# Patient Record
Sex: MALE | Race: AMERICAN INDIAN OR ALASKA NATIVE | NOT HISPANIC OR LATINO | Employment: FULL TIME | URBAN - METROPOLITAN AREA
[De-identification: names, ages, dates, MRNs, and addresses within clinical notes are randomized per-mention and may not be internally consistent; named-entity substitution may affect disease eponyms.]

---

## 2024-06-06 ENCOUNTER — HOSPITAL ENCOUNTER (INPATIENT)
Facility: CLINIC | Age: 29
LOS: 5 days | Discharge: HOME | DRG: 885 | End: 2024-06-13
Attending: EMERGENCY MEDICINE | Admitting: PSYCHIATRY & NEUROLOGY

## 2024-06-06 ENCOUNTER — TELEPHONE (OUTPATIENT)
Dept: BEHAVIORAL HEALTH | Facility: CLINIC | Age: 29
End: 2024-06-06

## 2024-06-06 DIAGNOSIS — F33.2 MAJOR DEPRESSIVE DISORDER, RECURRENT SEVERE WITHOUT PSYCHOTIC FEATURES (H): Primary | ICD-10-CM

## 2024-06-06 DIAGNOSIS — F31.62 BIPOLAR DISORDER, CURRENT EPISODE MIXED, MODERATE (H): ICD-10-CM

## 2024-06-06 PROBLEM — F31.9 BIPOLAR DISORDER (H): Status: ACTIVE | Noted: 2024-06-06

## 2024-06-06 PROBLEM — F12.20 CANNABIS DEPENDENCE (H): Status: ACTIVE | Noted: 2024-06-06

## 2024-06-06 LAB
AMPHETAMINES UR QL SCN: ABNORMAL
BARBITURATES UR QL SCN: ABNORMAL
BENZODIAZ UR QL SCN: ABNORMAL
BZE UR QL SCN: ABNORMAL
CANNABINOIDS UR QL SCN: ABNORMAL
CREAT UR-MCNC: 194 MG/DL
FENTANYL UR QL: ABNORMAL
OPIATES UR QL SCN: ABNORMAL
PCP QUAL URINE (ROCHE): ABNORMAL

## 2024-06-06 PROCEDURE — 99285 EMERGENCY DEPT VISIT HI MDM: CPT | Performed by: EMERGENCY MEDICINE

## 2024-06-06 PROCEDURE — 99418 PROLNG IP/OBS E/M EA 15 MIN: CPT | Performed by: NURSE PRACTITIONER

## 2024-06-06 PROCEDURE — 80349 CANNABINOIDS NATURAL: CPT | Performed by: NURSE PRACTITIONER

## 2024-06-06 PROCEDURE — 80307 DRUG TEST PRSMV CHEM ANLYZR: CPT | Performed by: EMERGENCY MEDICINE

## 2024-06-06 PROCEDURE — 99255 IP/OBS CONSLTJ NEW/EST HI 80: CPT | Performed by: NURSE PRACTITIONER

## 2024-06-06 PROCEDURE — 250N000013 HC RX MED GY IP 250 OP 250 PS 637: Performed by: NURSE PRACTITIONER

## 2024-06-06 RX ORDER — QUETIAPINE FUMARATE 50 MG/1
100 TABLET, EXTENDED RELEASE ORAL AT BEDTIME
Status: DISCONTINUED | OUTPATIENT
Start: 2024-06-06 | End: 2024-06-13 | Stop reason: HOSPADM

## 2024-06-06 RX ADMIN — QUETIAPINE FUMARATE 100 MG: 50 TABLET, EXTENDED RELEASE ORAL at 20:47

## 2024-06-06 ASSESSMENT — ACTIVITIES OF DAILY LIVING (ADL)
ADLS_ACUITY_SCORE: 35

## 2024-06-06 ASSESSMENT — COLUMBIA-SUICIDE SEVERITY RATING SCALE - C-SSRS
5. HAVE YOU STARTED TO WORK OUT OR WORKED OUT THE DETAILS OF HOW TO KILL YOURSELF? DO YOU INTEND TO CARRY OUT THIS PLAN?: YES
6. HAVE YOU EVER DONE ANYTHING, STARTED TO DO ANYTHING, OR PREPARED TO DO ANYTHING TO END YOUR LIFE?: YES
4. HAVE YOU HAD THESE THOUGHTS AND HAD SOME INTENTION OF ACTING ON THEM?: YES
2. HAVE YOU ACTUALLY HAD ANY THOUGHTS OF KILLING YOURSELF IN THE PAST MONTH?: YES
3. HAVE YOU BEEN THINKING ABOUT HOW YOU MIGHT KILL YOURSELF?: YES
1. IN THE PAST MONTH, HAVE YOU WISHED YOU WERE DEAD OR WISHED YOU COULD GO TO SLEEP AND NOT WAKE UP?: YES
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
## 2024-06-06 NOTE — MEDICATION SCRIBE - ADMISSION MEDICATION HISTORY
Medication Scribe Admission Medication History    Admission medication history is complete. The information provided in this note is only as accurate as the sources available at the time of the update.    Information Source(s): Patient, Hospital records, and CareEverywhere/SureScripts via in-person    Pertinent Information: Patient has not taken Carafate in 9 years.     Changes made to PTA medication list:  Added: Sucralfate 1 gm/ 10 ml QID.  Deleted: None  Changed: None    Allergies reviewed with patient and updates made in EHR: yes    Medication History Completed By: Dedrick Bernal MD 6/6/2024 10:40 AM    PTA Med List   Medication Sig Last Dose    sucralfate (CARAFATE) 1 GM/10ML suspension Take 10 mLs (1 g) by mouth 4 times daily More than a month at unknown

## 2024-06-06 NOTE — PLAN OF CARE
Estevan Harris  June 6, 2024  Plan of Care Hand-off Note     Patient Care Path: inpatient mental health    Plan for Care:   Patient reports suicidal ideation with plan to overdose on Fentanyl.  Patient reports recent manic and dissociative episodes.  Patient has not been on medications for 3 years.  He has no outpatient supports.  Plan for inpatient admission.    Identified Goals and Safety Issues: Further evaluation and stabilization    Legal Status: Legal Status at Admission: Voluntary/Patient has signed consent for treatment    Psychiatry Consult: ordered       Updated Dr Shaffer regarding plan of care.           NBA HarrySW

## 2024-06-06 NOTE — ED NOTES
Report given to JEFFERY Acharya RN.    ED to Behavioral Floor Handoff    SITUATION  Estevan Harris is a 28 year old male who speaks English and lives in a home with others The patient arrived in the ED by private car from emergency room with a complaint of Psychiatric Evaluation and Suicidal (Thoughts only, no plan)  .The patient's current symptoms started/worsened 7 month(s) ago and during this time the symptoms have increased.   In the ED, pt was diagnosed with   Final diagnoses:   Bipolar disorder, current episode mixed, moderate (H)        Initial vitals were: BP: (!) 158/92  Pulse: 76  Temp: 98  F (36.7  C)  Resp: 16  Weight: 103.9 kg (229 lb)  SpO2: 98 %   --------  Is the patient diabetic? No   If yes, last blood glucose? --     If yes, was this treated in the ED? --  --------  Is the patient inebriated (ETOH) No or Impaired on other substances? No  MSSA done? N/A  Last MSSA score: --    Were withdrawal symptoms treated? N/A  Does the patient have a seizure history? No. If yes, date of most recent seizure--  --------  Is the patient patient experiencing suicidal ideation? has a history of suicidal attempts, most recent within 1 month     Homicidal ideation? denies current or recent homicidal ideation or behaviors.    Self-injurious behavior/urges? reports current or recent self injurious behavior or ideation including head-banging, ripping out piercings, punching walls, etc.  ------  Was pt aggressive in the ED No  Was a code called No  Is the pt now cooperative? Yes  -------  Meds given in ED: Medications - No data to display   Family present during ED course? No  Family currently present? No    BACKGROUND  Does the patient have a cognitive impairment or developmental disability? No  Allergies:   Allergies   Allergen Reactions    Amoxicillin     Asa [Aspirin]      Bleeding      Ibuprofen      bleeding   .   Social demographics are   Social History     Socioeconomic History    Marital status: Single      Spouse name: None    Number of children: None    Years of education: None    Highest education level: None   Tobacco Use    Smoking status: Every Day     Current packs/day: 0.50     Types: Cigarettes   Substance and Sexual Activity    Alcohol use: Yes     Comment: ocassional    Drug use: No    Sexual activity: Not Currently        ASSESSMENT  Labs results   Labs Ordered and Resulted from Time of ED Arrival to Time of ED Departure   URINE DRUG SCREEN PANEL - Abnormal       Result Value    Amphetamines Urine Screen Negative      Barbituates Urine Screen Negative      Benzodiazepine Urine Screen Negative      Cannabinoids Urine Screen Positive (*)     Cocaine Urine Screen Negative      Fentanyl Qual Urine Screen Negative      Opiates Urine Screen Negative      PCP Urine Screen Negative        Imaging Studies: No results found for this or any previous visit (from the past 24 hour(s)).   Most recent vital signs BP (!) 158/92   Pulse 76   Temp 98  F (36.7  C) (Oral)   Resp 16   Wt 103.9 kg (229 lb)   SpO2 98%   BMI 33.82 kg/m     Abnormal labs/tests/findings requiring intervention:---   Pain control: pt had none  Nausea control: pt had none    RECOMMENDATION  Are any infection precautions needed (MRSA, VRE, etc.)? No If yes, what infection? --  ---  Does the patient have mobility issues? independently. If yes, what device does the pt use? ---  ---  Is patient on 72 hour hold or commitment? No If on 72 hour hold, have hold and rights been given to patient? N/A  Are admitting orders written if after 10 p.m. ?N/A      Meryl Watts RN   [phone] [facility]
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
## 2024-06-06 NOTE — ED TRIAGE NOTES
Pt presents to the ED for concerns of suicidal thoughts/plan. Pt has attempted to end his life numerous times with fentanyl. Pt states last use of fentanyl was around 5-6 months ago. Pt has a hx of bipolar who has been off of medications for 5 years due to the price of them. Pt also believes he has dissociative identity disorder. Pt is looking for help.     Triage Assessment (Adult)       Row Name 06/06/24 0721          Triage Assessment    Airway WDL WDL        Respiratory WDL    Respiratory WDL WDL        Skin Circulation/Temperature WDL    Skin Circulation/Temperature WDL WDL        Cardiac WDL    Cardiac WDL WDL        Peripheral/Neurovascular WDL    Peripheral Neurovascular WDL WDL        Cognitive/Neuro/Behavioral WDL    Cognitive/Neuro/Behavioral WDL WDL

## 2024-06-06 NOTE — PROGRESS NOTES
Patient arrived at the [facility] unit at 1230. He was calm and cooperative upon arrival. Patient was admitted to the ER due to suicidal ideation. The plan I to overdose on fentanyl.  Patient stated he does not feel safe at home. He currently denied pain, SI/HI/SIB. Patient contracted for safety. Patient ate lunch and is resting comfortable in his chair. In  the milieu. No major concerns during the morning shift. Will continue to monitor for behaviors.
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
## 2024-06-06 NOTE — PROGRESS NOTES
Triage & Transition Services, Extended Care     Client Name: Estevan Harris    Date: June 6, 2024    Patient was seen    Mode of Assessment:      Service Type: attended group session  Session Start Time:  1609    Session End Time: 1639  Session Length: 30  Site Location: [facility] EMERGENCY DEPARTMENT                             [id number]  Total Number ofAttendees: 3  Topic:   balanced lifestyle, coping skills/lifestyle management   Response: able to recall/repeat info presented, cooperative with task, discussed personal experience with topic, listened actively, offered helpful suggestions to peers     Tereza Medrano [facility]ITA   Licensed Mental Health Professional (LMHP), Extended Care  [phone]

## 2024-06-06 NOTE — ED PROVIDER NOTES
"ED Provider Note  [facility]      History     Chief Complaint   Patient presents with    Psychiatric Evaluation    Suicidal     Thoughts only, no plan     HPI  Estevan Harris is a 28 year old male who presents with suicidal thoughts.  Patient reports that he has not been taking his medications for the last 3 years.  He reports he currently does not feel suicidal but notes \"some of my other personalities might\".  He does report he feels safe here in the emergency department.  Reports he last used cocaine possibly 2 weeks ago though \"possibly 1 personality used it recently\".  He reports that he has been self-medicating with drugs including cocaine, Adderall.  He reports he supposed to be on risperidone and Seroquel but stopped taking those about 3 years ago.  Reports that he wants to get his mental health in order.    Past Medical History  Past Medical History:   Diagnosis Date    Anxiety     Depressive disorder     NO ACTIVE PROBLEMS      Past Surgical History:   Procedure Laterality Date    none       sucralfate (CARAFATE) 1 GM/10ML suspension      Allergies   Allergen Reactions    Amoxicillin     Asa [Aspirin]      Bleeding      Ibuprofen      bleeding     Family History  History reviewed. No pertinent family history.  Social History   Social History     Tobacco Use    Smoking status: Every Day     Current packs/day: 0.50     Types: Cigarettes   Substance Use Topics    Alcohol use: Yes     Comment: ocassional    Drug use: No      A medically appropriate review of systems was performed with pertinent positives and negatives noted in the HPI, and all other systems negative.    Physical Exam   BP: (!) 158/92  Pulse: 76  Temp: 98  F (36.7  C)  Resp: 16  Weight: 103.9 kg (229 lb)  SpO2: 98 %  Physical Exam  General: awake, alert, NAD  Head: normal cephalic  HEENT: pupils equal, conjugate gaze intact  Neck: Supple  CV: regular rate   Lungs: Breathing comfortably on room air  EXT: No deformity " noted  Neuro: awake, answers questions appropriately. No focal deficits noted   Psych: calm, does not appear to be responding to internal stimuli    ED Course, Procedures, & Data      Procedures    Results for orders placed or performed during the hospital encounter of 06/06/24   Urine Drug Screen Panel     Status: Abnormal   Result Value Ref Range    Amphetamines Urine Screen Negative Screen Negative    Barbituates Urine Screen Negative Screen Negative    Benzodiazepine Urine Screen Negative Screen Negative    Cannabinoids Urine Screen Positive (A) Screen Negative    Cocaine Urine Screen Negative Screen Negative    Fentanyl Qual Urine Screen Negative Screen Negative    Opiates Urine Screen Negative Screen Negative    PCP Urine Screen Negative Screen Negative   Urine Drug Screen     Status: Abnormal    Narrative    The following orders were created for panel order Urine Drug Screen.  Procedure                               Abnormality         Status                     ---------                               -----------         ------                     Urine Drug Screen Panel[[id number]]      Abnormal            Final result                 Please view results for these tests on the individual orders.     Medications - No data to display  Labs Ordered and Resulted from Time of ED Arrival to Time of ED Departure   URINE DRUG SCREEN PANEL - Abnormal       Result Value    Amphetamines Urine Screen Negative      Barbituates Urine Screen Negative      Benzodiazepine Urine Screen Negative      Cannabinoids Urine Screen Positive (*)     Cocaine Urine Screen Negative      Fentanyl Qual Urine Screen Negative      Opiates Urine Screen Negative      PCP Urine Screen Negative       No orders to display          Critical care was not performed.     Medical Decision Making  The patient's presentation was of high complexity (a chronic illness severe exacerbation, progression, or side effect of treatment).    The patient's  evaluation involved:  review of external note(s) from 1 sources (see separate area of note for details)  ordering and/or review of 1 test(s) in this encounter (see separate area of note for details)  discussion of management or test interpretation with another health professional (Case was discussed with mental health .)    The patient's management necessitated high risk (a decision regarding hospitalization).    Assessment & Plan    .  Estevan is a 28-year-old male who presents with suicidal thoughts has not been taking mental health medications for a long time.  Patient has no new medical concerns will need mental health assessment.    Patient had a mental health assessment.  Please see their note for full details.  In short the mental health  notes that the patient endorses increased suicidal ideation for the last 3 months with plan to overdose on fentanyl.  Last attempt was several months ago, last hospitalization was 5 years ago.  Patient has not taken any of his medications and is interested in inpatient mental health and stabilization.  Per mental health assessment he has been struggling with a manic episode.     He did endorse to her that he was suicidal with plan to overdose on fentanyl so should he change his mind and want to leave he would likely need 72-hour hold or reassessment.    I have reviewed the nursing notes. I have reviewed the findings, diagnosis, plan and need for follow up with the patient.    New Prescriptions    No medications on file       Final diagnoses:   Bipolar disorder, current episode mixed, moderate (H)       Jesus Shaffer  [facility] EMERGENCY DEPARTMENT  6/6/2024     Jesus Shaffer MD  06/06/24 [id number]
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
## 2024-06-07 ENCOUNTER — TELEPHONE (OUTPATIENT)
Dept: BEHAVIORAL HEALTH | Facility: CLINIC | Age: 29
End: 2024-06-07

## 2024-06-07 PROCEDURE — 250N000013 HC RX MED GY IP 250 OP 250 PS 637: Performed by: NURSE PRACTITIONER

## 2024-06-07 PROCEDURE — 99245 OFF/OP CONSLTJ NEW/EST HI 55: CPT | Performed by: NURSE PRACTITIONER

## 2024-06-07 PROCEDURE — 99417 PROLNG OP E/M EACH 15 MIN: CPT | Performed by: NURSE PRACTITIONER

## 2024-06-07 PROCEDURE — 250N000013 HC RX MED GY IP 250 OP 250 PS 637: Performed by: PSYCHIATRY & NEUROLOGY

## 2024-06-07 RX ORDER — DIVALPROEX SODIUM 250 MG/1
250 TABLET, DELAYED RELEASE ORAL EVERY 12 HOURS SCHEDULED
Status: DISCONTINUED | OUTPATIENT
Start: 2024-06-07 | End: 2024-06-08

## 2024-06-07 RX ADMIN — DIVALPROEX SODIUM 250 MG: 250 TABLET, DELAYED RELEASE ORAL at 21:27

## 2024-06-07 RX ADMIN — QUETIAPINE FUMARATE 100 MG: 50 TABLET, EXTENDED RELEASE ORAL at 21:28

## 2024-06-07 ASSESSMENT — COLUMBIA-SUICIDE SEVERITY RATING SCALE - C-SSRS
TOTAL  NUMBER OF INTERRUPTED ATTEMPTS SINCE LAST CONTACT: NO
6. HAVE YOU EVER DONE ANYTHING, STARTED TO DO ANYTHING, OR PREPARED TO DO ANYTHING TO END YOUR LIFE?: NO
2. HAVE YOU ACTUALLY HAD ANY THOUGHTS OF KILLING YOURSELF?: NO
SUICIDE, SINCE LAST CONTACT: NO
1. SINCE LAST CONTACT, HAVE YOU WISHED YOU WERE DEAD OR WISHED YOU COULD GO TO SLEEP AND NOT WAKE UP?: NO
TOTAL  NUMBER OF ABORTED OR SELF INTERRUPTED ATTEMPTS SINCE LAST CONTACT: NO
ATTEMPT SINCE LAST CONTACT: NO

## 2024-06-07 NOTE — CONSULTS
"Adult Psychiatry Consultation     Estevan Harris MRN# [id number]   Age: 28 year old YOB: 1995   Date of Admission to ED: 6/6/2024    In person visit Details:     Patient was assessed and interviewed face-to-face in person with this writer   Assessment methods included conducting a formal interview with patient, review of medical records, collaboration with medical staff, and obtaining relevant collateral information from family and community providers when available.      DAVID Robb CNP            Contacts:   Attending Physician: Dean Nichols MD     Current Outpatient Psychiatrist: none   Primary Care Provider: No primary care provider on file.         Reason for Consult:       Pediatric Psychiatry IP Consult: Recommendations  Requesting Provider:  Other supervising provider, Mary Doty       Subjective:       14:05-14:15 Writer knocked and asked to meet with patient.  The patient was lying in a dark room with his back towards the door and he was facing the wall.  When writer entered he rolled over and sat up on the side of the bed.  He reported that he felt more clear minded today.  He was unable or unwilling to describe a clear minded  meant, saying \"clear\" is the best way to describe it.  He was unable to say what was different today compared to yesterday.  He denied suicidal ideation.  He rated depression at 5/10, anxiety 5/10, with 10 being the worst.  He did not give anger a number rating, but said it was off and on.  The trigger for his anger was not having his dog.  He denied HI/AH/VH.  He reported his sleep last night was the best he has had in a long time. He endorsed that the medication (Seroquel  mg) was helpful.  He reports his appetite is okay.  When writer inquired about his feeling safe to go home, he quickly became agitated and said he was not safe to go home.  He made some reference to having substance use issues and always need to take pills.  He declined " "to have a CD assessment completed and then repeated that he will always have CD issues because he will always have to take pills.  He reports he does not currently have the ability to take care of himself or his dog.  The patient continued to be agitated and asked for the conversation to be over.          Objective:       Patient was lying on the bed in a BEC room with his back to the door, facing the wall.  Writer knocked and requested to meet.  He rolled over and sat up on the side of the bed.  His responses to writer's questions were short but calm until writer asked if he thought he could be safe to discharge.  When discharge was brought up he quickly became agitated and oppositional and abruptly ended the conversation.     Review of staff notes:  Patient has been pleasant and calm in the milieu.  He reports SI with a plan to overdose on fentanyl during the day yesterday.  In the evening he denied SI/AH/VH.    Medications:     Current Facility-Administered Medications   Medication Dose Route Frequency Provider Last Rate Last Admin    QUEtiapine (SEROquel XR) 24 hr tablet 100 mg  100 mg Oral At Bedtime Zee Roque APRN CNP   100 mg at 06/06/24 2047     Current Outpatient Medications   Medication Sig Dispense Refill    sucralfate (CARAFATE) 1 GM/10ML suspension Take 10 mLs (1 g) by mouth 4 times daily 420 mL 1              Collateral information from chart review and phone calls:     Reviewed DEC assessment, Initial ED consult, and ED notes.    Per Ness Albright note on 6/7/2024:  \"Patient denies any current SIB, SI, or HI. Patient denies any symptoms of psychosis. Patient endorses using \"street drugs\", reports he does not want to answer any questions about this. Patient reports anger towards psychiatric nurse practitioner who recommended a chemical health assessment. Patient denies any concern for his substance use. Patient reports preference \"not to talk to that lady ever again\". Patient was informed " "that he does not meet criteria for inpatient admission at this time due to denying any acute risk to himself or others. Patient reports feeling \"misled\", and refuses to answer any further inquiries. Patient was offered observation stay in order to continue receiving his psychiatric medication prior to discharge. Patient is agreeable. Patient will be discharged with short term supply of medication after brief observation to ensure continued stability.\"    Collateral information from the famly/friend: none    Mental Status Exam:   Appearance:  dressed in hospital scrubs, unkempt, and disheveled   Attitude:  guarded and initially somewhat cooperative, after discharge was brought up irritable and dismissive.   Eye Contact:  poor   Mood:   anxiety improved.  Intermittent anger  Affect:  labile, irritable   Speech:  clear, coherent  Psychomotor Behavior:  no evidence of tardive dyskinesia, dystonia, or tics  Thought Process:  linear  Associations:  no loose associations  Thought Content:  no evidence of suicidal ideation or homicidal ideation and no evidence of psychotic thought, goal oriented to be admitted IP  Insight:  limited  Judgment:  limited  Oriented to:  time, person, and place  Attention Span and Concentration:  fair  Recent and Remote Memory:  fair  Fund of Knowledge: low-normal  Muscle Strength and Tone: normal  Gait and Station: Normal         Physical Exam:     /86   Pulse 82   Temp 97.2  F (36.2  C) (Oral)   Resp 16   Wt 103.9 kg (229 lb)   SpO2 96%   BMI 33.82 kg/m    Weight is 229 lbs 0 oz Data Unavailable Body mass index is 33.82 kg/m .    QTc: No recent EKG on file     Laboratory/Imaging:    Recent Results (from the past 168 hour(s))   Urine Drug Screen Panel    Collection Time: 06/06/24  7:54 AM   Result Value Ref Range    Amphetamines Urine Screen Negative Screen Negative    Barbituates Urine Screen Negative Screen Negative    Benzodiazepine Urine Screen Negative Screen Negative    " Cannabinoids Urine Screen Positive (A) Screen Negative    Cocaine Urine Screen Negative Screen Negative    Fentanyl Qual Urine Screen Negative Screen Negative    Opiates Urine Screen Negative Screen Negative    PCP Urine Screen Negative Screen Negative   Urine Creatinine for Drug Screen Panel    Collection Time: 06/06/24  7:54 AM   Result Value Ref Range    Creatinine Urine for Drug Screen 194 mg/dL       ROS: 10 point ROS neg other than the symptoms noted above in the subjective.     I have reviewed the physical done by ED provider on 6/6/2024. Notable changes include: none              Impression:     This patient is a 28 year old year old  male with a past psychiatric history of  PTSD, depression, and polysubstance use, who presented to the ED on 6/6/2024  for reports of memory loss before waking up in a pile of glass.  Prior to presenting to the ED, Estevan reports he learned his X is taking him to court for his emotional support dog and then triggered his PTSD from having his daughter taken away from him 8 years ago.  He reports a past dx of bipolar and he has not been taking his medication for 3 years. Estevan reports based on his friends reports of his different personalities and his research on the internet he thinks he has dissociative identity disorder and he is ready to get help and knows he needs to take medication to be okay.     Today, patient denied SI/AH/VH/HI when meeting with writer.  He became agitated when discharge was brought up and ended the conversation.     The patient meets criteria for Unspecified Trauma and Stressor related disorder, R/O cluster B personality disorder, polysubstance use, and R/O malingering  Patient has not been taking medication PTA.  Acute inpatient stabilization is not needed as indicated by is denying SI/HI/AH/VH.  His sleep has improved and he is eating and drinking without difficulty.  Patient became agitated when writer asked if he was safe to discharge.  He ended  the conversation.  He was seen by Ness Albright [facility] and he denied SI/HI/AH/VH to her also.  He declined CD assessment to both this writer and Ness Albright. Dr Marcell JACOBS saw patient and started him on Depakote  mg bid for mood stabilization.  He reports he slept very well after taking Seroquel  mg hs. Patient was changed to observation status due to not meeting criteria for IP admission.  He will be monitored while he starts Depakote and then be discharged from [facility].     Other recommendations are listed below.     Risk for harm is moderate.    Brief Therapeutic Intervention(s):   Provided rappport building, active listening, unconditional positive regard, and validation.    Legal Status: Voluntary           Diagnoses:     Principal Diagnosis: Unspecified Trauma and Stressor related disorder    Secondary psychiatric diagnoses of concern this admission:  Polysubstance Use Disorder  R/O cluster B personality disorder  R/O malingering for secondary gain - possibly involving court case over dog.     Current medical diagnosis being treated:   none         Recommendations:     Discussed the case and writer's recommendations with Dr Alexi Faith MD.     Recommend observation status for monitoring, safety, stabilization and further assessment and re-evaluation tomorrow for possible discharge.   2.   Continue:   Seroquel  mg hs for sleep and thought disorder  Depakote  mg bid for mood stabilization - started by Dr Jonathan JACOBS today.   3.   Recommend CD assessment - patient declined.   4.   Recommend quantitative THC urine level - pending  5.   Recommend obtaining collateral information from previous hospital admissions. Need ROIs.    6.   Continue to consult psychiatry as needed.     Attestation:  Patient time: 10 minutes  Reviewed labs, EKG, and relevant imagin minutes  Family/guardian/other time: 0 minutes  Team/BHP/ED/EC staff time: 20 minutes  Chart review: 20 minutes  Documentation: 30  minutes  Total time: 80 minutes spent on the date of the encounter.     I have provided critical care services at the bedside in the [facility] behavioral emergency [place], evaluating the patient, reviewing notes and laboratory values and directing care. I have discussed recommendation regarding whether or not hospitalization is needed and recommendations for medications and laboratory testing with the attending emergency department provider. Zee Segal, DNP, APRN, CNP June 7, 2024.    Disclaimer: This note consists of symbols derived from keyboarding, dictation, and/or voice recognition software. As a result, there may be errors in the script that have gone undetected.  Please consider this when interpreting information found in the chart.    Please call [facility]/DEC at [phone] if you have follow-up questions or wish to place another consult.

## 2024-06-07 NOTE — ED NOTES
Patient was calm and cooperative this morning. Patient denied pain, SI/HI/SIB. Patient contracted for safety. Patient was isolative and withdrawn this morning. He stated NP visited him this afternoon and the visit did not go well. Writer offered him a PRN but patient refused. Will continue to monitor for behaviors.

## 2024-06-07 NOTE — ED NOTES
This evening patient was medication compliant. He denied pain, SI/HI/SIB. Patient contracted for safety. Patient was engaged in the milieu with peers. No major concerns during the shift, Will continue to monitor for behaviors.

## 2024-06-07 NOTE — ED NOTES
No significant event at noc. shift. Patient resting comfortably with eyes closed, respirations unlabored on all safety checks. No indication to staff of being awake. No s/s of pain distress/discomfort. Staff to continue to assess/monitor.

## 2024-06-07 NOTE — ED PROVIDER NOTES
Patient who is in [facility], that was signed out to me by the night physician in the main emergency department at shift change.  The patient has been evaluated by mental health, completed a DEC assessment, and deemed in need of admission pending stabilization or other change in status.  Patient remains in the [facility] while awaiting inpatient bed placement.  The patient has been medically cleared, and home medications are being administered.  Any necessary psychiatric consultation has been ordered.  I will be available to manage this patient for any acute issues that should arise until the provider in the [facility] [place] arrives to assume care today, at which time they will assume care of the patient.  I will continue to be available and address any pending mental health recommendations or medical issues, that should arise either by nursing, the psychiatric consultant, or by the behavioral extended care team, until the time of that signout.       Dean Nichols MD  06/07/24 0754

## 2024-06-07 NOTE — ED PROVIDER NOTES
[facility] ED Mental Health Handoff Note:       Brief HPI:  This is a 28 year old male signed out to me.  See initial ED Provider note for full details of the presentation. Interval history is pertinent for ongoing ED boarding. No acute concerns today. Irritable. Feels Seroquel helps. Interested in restarting Depakote.    Home meds reviewed and ordered/administered: Yes    Medically stable for inpatient mental health admission: Yes.    Evaluated by mental health: Yes. The recommendation is for inpatient mental health treatment. Bed search in process    Safety concerns: At the time I received sign out, there were no safety concerns.    Hold Status:  Active Orders   N/A       Exam:   Patient Vitals for the past 24 hrs:   BP Temp Temp src Pulse Resp SpO2   06/07/24 1002 128/86 97.2  F (36.2  C) Oral 82 16 96 %       ED Course:    Medications   QUEtiapine (SEROquel XR) 24 hr tablet 100 mg ( Oral Canceled Entry 6/6/24 2200)            There were no significant events during my shift.      Impression:    ICD-10-CM    1. Bipolar disorder, current episode mixed, moderate (H)  F31.62           Plan:    Awaiting mental health evaluation/recommendations.  Will restart Depakote 250 mg BID.  Patient will be made ED OBS status.      RESULTS:   No results found for this visit on 06/06/24 (from the past 24 hour(s)).          MD Marcell Giordano Dung Hoang, MD  06/07/24 1330       Alexi Faith MD  06/07/24 1550

## 2024-06-07 NOTE — ED NOTES
Patient Education       Coronavirus Disease 2019 (COVID-19): Overview  Coronavirus disease 2019 (COVID-19) is an illness that infects the lungs. It's caused by a type of coronavirus called SARS-CoV-2. There are many types of coronaviruses. They are a very common cause of colds and bronchitis. They can cause a lung infection called pneumonia. Symptoms can range from mild to severe. Some people have no symptoms. These viruses are also found in some animals.   The virus that causes COVID-19 changes (mutates) all the time. This is what all viruses do. It leads to different versions of a virus. These are called variants. COVID-19 variants may spread more easily from person to person. They may cause milder symptoms. Or they may cause more severe symptoms.    The virus spreads and infects people easily. It can infect a person more easily if they are not immune to it. The virus most often spreads through droplets of fluid that a person coughs or sneezes into the air. It may be spread to you if you touch a surface with the virus on it and then touch your eyes, nose, or mouth.      To help prevent spreading the infection, wash your hands often, or use an alcohol-based hand .     For the latest information from the CDC:    · Go to the CDC website  · Call 148-JWV-TOMZ ([phone])    What are the symptoms of COVID-19?  Some people have no symptoms. Some have mild symptoms. And other people may have severe symptoms. Types of symptoms can vary from person to person. They may appear 2 to 14 days after contact with the virus. They can include:   · Fever  · Chills  · Coughing  · Trouble breathing or feeling short of breath  · Sore throat  · Stuffy or runny nose  · Headache  · Body aches  · Tiredness  · Nausea, vomiting, diarrhea, or abdominal pain  · New loss of sense of smell or taste  Check your symptoms with the CDC’s Coronavirus Self-.   What are possible complications of COVID-19?  The virus can cause an  Patient in room at start of shift resting.  Completed one to one assessment, and patient identifies SI with plan to right eye on Fentanyl.  Patient denies HI, AVH, and contracts for safety.   Patient reports he does not feel safe at home and is concerned about his dog.   Patient spoke with friend regarding pay check to be delivered to him.  Patient ate 90% of dinner.   Calm and cooperative.  Sleeping at 10 pm.    infection in the lungs. This is called pneumonia. In some cases, this can lead to death. Experts are still learning more about COVID-19 complications. Many other complications are possible. They include:   · Low blood pressure  · Kidney failure  · Inflammation of the brain or heart  · Rashes  Some people are at higher risk for complications. This includes:   · Older adults  · People with heart or lung disease  · People with diabetes or kidney disease  · People with health conditions that suppress the immune system  · People who take medicines that suppress the immune system  Rarely, a child may have a severe complication. This is called multisystem inflammatory syndrome in children (MIS-C). MIS-C seems to be like Kawasaki disease. This is a rare illness. It causes inflammation of blood vessels and body organs. MIS can also happen in adults. But this is less common.     How is COVID-19 diagnosed?  Your healthcare provider will ask:  · What symptoms you have  · Where you live  · If you’ve traveled recently  · If you’ve had contact with sick people  · If you are vaccinated against COVID-19  · If you have had COVID-19   You may have 1 of these tests for COVID-19:  · Viral (molecular) test. You may also hear this called a PCR or RT-PCR test. Viral tests are very accurate. A viral test looks for the genetic material (RNA) of the SARS-CoV-2 virus. There are a few ways to do this. A swab may be wiped inside your nose or throat. Or a long swab may be put into your nose down to the back of your throat. Or a sample of your saliva may be taken. Your test results may be back in 45 minutes to a few hours. This depends on the type of test. Some tests must be sent to a lab. These can take several days for the results. Test kits you can use at home are now available. Some of these need a prescription. If you use a home kit, follow the instructions in the kit closely. Some kits show results quickly at home. Others must be sent to a  lab for the results.  · Antigen test. This can find proteins from the SARS-CoV-2 virus. A swab may be wiped inside your nose or throat. Or a long swab may be put into your nose down to the back of your throat. Some results are back within 15 to 60 minutes. This depends on the type of test. Positive results are very accurate. But false positive results can happen. And the results can be negative even in people with COVID-19. This is more common in places where not many people have the virus. Antigen tests are more likely to miss a COVID-19 infection than a viral (molecular) test. You may need to have a viral test if your antigen test is negative but you have symptoms of COVID-19.  If your provider thinks or confirms that you have COVID-19, you may have other tests. These tests may include:   · Antibody blood test. This type of test can show if you had the virus in the past. It shows antibodies for the virus in the blood. The accuracy of these tests varies. And they are not available everywhere. An antibody test may not show if you have an infection right now. This is because it can take up to a few weeks for your body to make antibodies. None of the antibody tests can yet be used to tell if a person is immune to the virus.  · Sputum culture. If you have a wet cough, you may be asked to cough up a bit of mucus (sputum) from your lungs. This is tested for the virus. It may be tested for pneumonia.  · Imaging tests. You may have a chest X-ray or CT scan.  Can you get COVID-19 again?  Yes, you can get COVID-19 more than once. You may have not gotten immunity. You could have lost the immunity. Or you may get COVID-19 from a different strain (variant) of the virus that you are not immune to. But the COVID-19 vaccine helps people who had COVID-19 lower their risk of having the illness again.   Vaccines for COVID-19  The FDA and CDC advise vaccines to help prevent COVID-19. The vaccines can also make the illness less severe.  It can keep you from needing to go to the hospital.  And it can prevent the spread of the virus to other people. No vaccine is 100% effective at preventing an illness. But getting a vaccine is important. The Pfizer vaccine is available for people as young as age 5. Pregnant or breastfeeding people can have the vaccine. Ask your healthcare provider which vaccine may be best for you.   The vaccines are given as a shot (injection). This is most often done in a muscle in the upper arm. There is a 1-dose vaccine. This is from [employer]. There are 2-dose vaccines. These are from Pfizer and Moderna. For a 2-dose vaccine, the second dose is given several weeks after the first. Some people may need a third dose of Pfizer or Moderna.   Who needs a third dose of Pfizer or Moderna?  A third dose of the Pfizer or Moderna vaccine may be needed for some people. This is for people who have a very weak immune system. The third dose is part of their first (primary) series. It's not a booster. This can happen if you had a solid organ transplant. It can be caused by other conditions or treatments. This third dose is to help a person with a weak immune system build up better protection against the virus. It's given at least 28 days after the second dose. Talk with your healthcare provider about your health risks to see if you need a third dose as part of your primary series.   COVID-19 vaccine booster shots  Everyone age 18 or older can get a COVID-19 booster shot. Here are your options.   Pfizer or Moderna booster  A booster shot of the Pfizer or Moderna vaccines is advised for many people. The booster shot is to be given at least 6 months after your primary series. Talk with your healthcare provider about your situation and risk. The CDC says these people should get a Pfizer or Moderna booster shot:     · People age 50 or older  · People age 18 or older who live in long-term care facilities    The CDC states people 18 to 49  may choose to get the booster. This depends on their specific case and risk. This includes people with certain health conditions. It also includes people whose job or living setting puts them at higher risk for COVID-19.   Hernesto & Hernesto booster  A booster shot of the 1-dose Hernesto [employer] Hernesto vaccine is also available. It's advised for people ages 18 and older who got their first dose 2 or more months ago.   Mix and match  You may be able to choose which vaccine you get as a booster. This is called mix and match. Talk with your healthcare provider to learn more.   How is COVID-19 treated?  The most proven treatments right now are those to help your body while it fights the virus. This is known as supportive care. It includes:   · Getting rest.This helps your body fight the illness.  · Drinking fluids. Try to drink 6 to 8 glasses of fluids every day. Ask your provider which drinks are best for you. Don't have drinks with caffeine or alcohol.  · Taking over-the-counter (OTC) medicine.  These are used to help ease pain and reduce fever. Ask your provider which OTC medicine is safe for you to use.  You may need to stay in the hospital for severe illness. Your care may include:   · IV (intravenous) fluids. These are given through a vein. This helps to replace fluids in your body.  · Oxygen. You may be given supplemental oxygen. Or you may be put on a breathing machine (ventilator). This is done so you get enough oxygen in your body.  · Prone positioning. Your healthcare team may regularly turn you on your stomach. This is called prone positioning. It helps increase the amount of oxygen you get to your lungs. Follow their instructions on position changes while you're in the hospital. Also follow their advice on the best positions to help your breathing once you go home.  · Remdesivir. This is an antiviral medicine. The FDA has approved it for use on COVID-19. It works by stopping the spread of the SARS-CoV-2 virus in  the body. It's approved only for people who are in the hospital. It's for people 12 years and older who weigh at least 88 pounds (40 kgs). In some cases, it may also be used for people younger than 12 years or who weigh less than 88 pounds (40 kgs).  · Steroids or other anti-inflammatory medicines.  These are used to lessen the intense inflammation that some people with COVID-19 can have. The inflammation can lead to more trouble breathing. It can cause other complications or death.  · COVID-19 convalescent plasma. Plasma is the liquid part of blood. People who had COVID-19 may be asked to donate plasma. This is called COVID-19 convalescent plasma. The plasma may have antibodies. These can help fight COVID-19 in people who are very ill with it. The FDA has approved it for emergency use in some people with severe but early COVID-19. Ask your provider if you qualify to donate.  · Monoclonal antibody therapy. The FDA approved this for emergency use in some people. They must have a positive COVID-19 viral test. They must have mild to moderate symptoms. They can’t be in the hospital. It's approved for people 12 years and older. They must weigh at least 88 pounds (40 kgs). And they must be at high risk for severe COVID-19 and a hospital stay. This includes people who are 65 years and older. And it includes people with some chronic conditions. This therapy is not approved for people who are in the hospital with COVID-19 or who need oxygen. Your healthcare team will tell you if you qualify.  Are you at risk for COVID-19?  You are at risk for COVID-19 if any of these apply to you:  · You live in an area with cases of COVID-19  · You traveled to an area with cases of COVID-19  · You had close contact with someone who had COVID-19  Close contact means being within 6 feet.   Keep in mind that COVID-19 may be spread by people who do not show symptoms.   Last updated: 11/22/2021   Leroy last reviewed this educational content  on 9/1/2021    © 4406-7441 [employer]. All rights reserved. This information is not intended as a substitute for professional medical care. Always follow your healthcare professional's instructions.

## 2024-06-07 NOTE — TELEPHONE ENCOUNTER
[facility] ONLY:  R: [place] [facility] Access Inpatient Bed Call Log  6/6/24 @ 11:15 pm     [facility]: @ capacity for Adult MH beds.      Pt remains on waitlist pending appropriate placement availability

## 2024-06-07 NOTE — TELEPHONE ENCOUNTER
[facility] ONLY:  R: [place] [facility] Access Inpatient Bed Call Log  6/7/24 @ 1:00am    [facility]: @ capacity for Adult MH beds.     Pt remains on waitlist pending appropriate placement availability

## 2024-06-07 NOTE — TELEPHONE ENCOUNTER
R: Pt wants [facility] only.  Pt remains on the waitlist pending appropriate bed availlability.      10:44pm- Dr. Jensen accepts for 10/[place]/Marielle.     [address] staff notified as CRN is still taking report.     [facility] RN notified that he can call after midnight for report.      Indicia completed.

## 2024-06-07 NOTE — PROGRESS NOTES
"Triage & Transition Services, Extended Care     Therapy Progress Note    Patient: Estevan goes by \"Estevan,\" uses he/him pronouns  Date of Service: June 7, 2024  Site of Service: [facility] EMERGENCY DEPARTMENT                             [id number]  Patient was seen: yes  Mode of Assessment: In person    Presentation Summary: See below.    Therapeutic Intervention(s) Provided: Reviewed healthy living that supports positive mental health, including looking at sleep hygiene, regular movement, nutrition, and regular socialization., Engaged in activity scheduling and behavioral activation, looking at and reviewing the prior week's goals, problem solving any barriers and acknowledging successes, as well as setting new goals.    Current Symptoms:   irritable   agitation      Mental Status Exam   Affect:  (incongruent)  Appearance: Appropriate  Attention Span/Concentration: Attentive  Eye Contact: Variable    Fund of Knowledge: Appropriate   Language /Speech Content: Fluent  Language /Speech Volume: Normal  Language /Speech Rate/Productions: Normal  Recent Memory: Variable  Remote Memory: Variable  Mood: Other (please comment), Normal (incongruent)  Orientation to Person: Yes   Orientation to Place: Yes  Orientation to Time of Day: Yes  Orientation to Date: Yes     Situation (Do they understand why they are here?): Yes  Psychomotor Behavior: Normal  Thought Content: Suicidal, Hallucinations  Thought Form: Goal Directed    Treatment Objective(s) Addressed: identifying an appropriate aftercare plan, exploring obstacles to safety in the community    Patient Response to Interventions: verbalizes understanding    Progress Towards Goals: Patient Reports Symptoms Are: improving  Patient Progress Toward Goals: is making progress  Next Step to Work Toward Discharge: awaiting acceptance at community level of care  Awaiting Acceptance at Community Level of Care Comment: Patient reports he will have health insurance starting " "next month, and can follow up with outpatient care at that time.    Case Management:      Plan: observation  yes provider Dr. Alexi Faith    Clinical Substantiation: Patient denies any current SIB, SI, or HI. Patient denies any symptoms of psychosis. Patient endorses using \"street drugs\", reports he does not want to answer any questions about this. Patient reports anger towards psychiatric nurse practitioner who recommended a chemical health assessment. Patient denies any concern for his substance use. Patient reports preference \"not to talk to that lady ever again\". Patient was informed that he does not meet criteria for inpatient admission at this time due to denying any acute risk to himself or others. Patient reports feeling \"misled\", and refuses to answer any further inquiries. Patient was offered observation stay in order to continue receiving his psychiatric medication prior to discharge. Patient is agreeable. Patient will be discharged with short term supply of medication after brief observation to ensure continued stability.    Legal Status: Legal Status at Admission: Voluntary/Patient has signed consent for treatment    Session Status: Time session started: 1600  Time session ended: 1620  Session Duration (minutes): 20 minutes  Session Number: 1  Anticipated number of sessions or this episode of care: 3    Time Spent: 20 minutes    CPT Code: CPT Codes: 30329 - Psychotherapy (with patient) - 30 (16-37*) min    Diagnosis:   Patient Active Problem List   Diagnosis Code    Bipolar disorder (H) F31.9    Cannabis dependence (H) F12.20       Primary Problem This Admission: Active Hospital Problems    *Bipolar disorder (H)      Cannabis dependence (H)        CATARINO Garcia   Licensed Mental Health Professional (LMHP), [facility]  [phone]  "

## 2024-06-08 PROBLEM — F33.2 MAJOR DEPRESSIVE DISORDER, RECURRENT SEVERE WITHOUT PSYCHOTIC FEATURES (H): Status: ACTIVE | Noted: 2024-06-08

## 2024-06-08 LAB
ALBUMIN SERPL BCG-MCNC: 4.8 G/DL (ref 3.5–5.2)
ALP SERPL-CCNC: 95 U/L (ref 40–150)
ALT SERPL W P-5'-P-CCNC: 110 U/L (ref 0–70)
ANION GAP SERPL CALCULATED.3IONS-SCNC: 15 MMOL/L (ref 7–15)
AST SERPL W P-5'-P-CCNC: 48 U/L (ref 0–45)
BASOPHILS # BLD AUTO: 0.1 10E3/UL (ref 0–0.2)
BASOPHILS NFR BLD AUTO: 1 %
BILIRUB SERPL-MCNC: 0.2 MG/DL
BUN SERPL-MCNC: 12.4 MG/DL (ref 6–20)
CALCIUM SERPL-MCNC: 9.7 MG/DL (ref 8.6–10)
CHLORIDE SERPL-SCNC: 101 MMOL/L (ref 98–107)
CREAT SERPL-MCNC: 0.82 MG/DL (ref 0.67–1.17)
DEPRECATED HCO3 PLAS-SCNC: 24 MMOL/L (ref 22–29)
EGFRCR SERPLBLD CKD-EPI 2021: >90 ML/MIN/1.73M2
EOSINOPHIL # BLD AUTO: 0.3 10E3/UL (ref 0–0.7)
EOSINOPHIL NFR BLD AUTO: 3 %
ERYTHROCYTE [DISTWIDTH] IN BLOOD BY AUTOMATED COUNT: 12.8 % (ref 10–15)
FOLATE SERPL-MCNC: 9.4 NG/ML (ref 4.6–34.8)
GLUCOSE SERPL-MCNC: 87 MG/DL (ref 70–99)
HBA1C MFR BLD: 5.5 %
HCT VFR BLD AUTO: 49.3 % (ref 40–53)
HGB BLD-MCNC: 16.6 G/DL (ref 13.3–17.7)
IMM GRANULOCYTES # BLD: 0.1 10E3/UL
IMM GRANULOCYTES NFR BLD: 1 %
LYMPHOCYTES # BLD AUTO: 3.2 10E3/UL (ref 0.8–5.3)
LYMPHOCYTES NFR BLD AUTO: 25 %
MCH RBC QN AUTO: 30.9 PG (ref 26.5–33)
MCHC RBC AUTO-ENTMCNC: 33.7 G/DL (ref 31.5–36.5)
MCV RBC AUTO: 92 FL (ref 78–100)
MONOCYTES # BLD AUTO: 0.8 10E3/UL (ref 0–1.3)
MONOCYTES NFR BLD AUTO: 7 %
NEUTROPHILS # BLD AUTO: 8 10E3/UL (ref 1.6–8.3)
NEUTROPHILS NFR BLD AUTO: 63 %
NRBC # BLD AUTO: 0 10E3/UL
NRBC BLD AUTO-RTO: 0 /100
PLATELET # BLD AUTO: 349 10E3/UL (ref 150–450)
POTASSIUM SERPL-SCNC: 4.1 MMOL/L (ref 3.4–5.3)
PROT SERPL-MCNC: 7.3 G/DL (ref 6.4–8.3)
RBC # BLD AUTO: 5.37 10E6/UL (ref 4.4–5.9)
SODIUM SERPL-SCNC: 140 MMOL/L (ref 135–145)
TSH SERPL DL<=0.005 MIU/L-ACNC: 1.01 UIU/ML (ref 0.3–4.2)
VIT B12 SERPL-MCNC: 1103 PG/ML (ref 232–1245)
VIT D+METAB SERPL-MCNC: 31 NG/ML (ref 20–50)
WBC # BLD AUTO: 12.5 10E3/UL (ref 4–11)

## 2024-06-08 PROCEDURE — 82746 ASSAY OF FOLIC ACID SERUM: CPT | Performed by: REGISTERED NURSE

## 2024-06-08 PROCEDURE — 250N000013 HC RX MED GY IP 250 OP 250 PS 637: Performed by: REGISTERED NURSE

## 2024-06-08 PROCEDURE — 93005 ELECTROCARDIOGRAM TRACING: CPT

## 2024-06-08 PROCEDURE — 83036 HEMOGLOBIN GLYCOSYLATED A1C: CPT | Performed by: REGISTERED NURSE

## 2024-06-08 PROCEDURE — 82306 VITAMIN D 25 HYDROXY: CPT | Performed by: REGISTERED NURSE

## 2024-06-08 PROCEDURE — 93010 ELECTROCARDIOGRAM REPORT: CPT | Performed by: INTERNAL MEDICINE

## 2024-06-08 PROCEDURE — 250N000013 HC RX MED GY IP 250 OP 250 PS 637: Performed by: PSYCHIATRY & NEUROLOGY

## 2024-06-08 PROCEDURE — 85025 COMPLETE CBC W/AUTO DIFF WBC: CPT | Performed by: REGISTERED NURSE

## 2024-06-08 PROCEDURE — 99233 SBSQ HOSP IP/OBS HIGH 50: CPT | Performed by: REGISTERED NURSE

## 2024-06-08 PROCEDURE — 250N000013 HC RX MED GY IP 250 OP 250 PS 637: Performed by: NURSE PRACTITIONER

## 2024-06-08 PROCEDURE — 124N000002 HC R&B MH UMMC

## 2024-06-08 PROCEDURE — 82607 VITAMIN B-12: CPT | Performed by: REGISTERED NURSE

## 2024-06-08 PROCEDURE — 80053 COMPREHEN METABOLIC PANEL: CPT | Performed by: REGISTERED NURSE

## 2024-06-08 PROCEDURE — 84443 ASSAY THYROID STIM HORMONE: CPT | Performed by: REGISTERED NURSE

## 2024-06-08 PROCEDURE — 36415 COLL VENOUS BLD VENIPUNCTURE: CPT | Performed by: REGISTERED NURSE

## 2024-06-08 RX ORDER — DIVALPROEX SODIUM 250 MG/1
250 TABLET, DELAYED RELEASE ORAL DAILY
Status: DISCONTINUED | OUTPATIENT
Start: 2024-06-09 | End: 2024-06-08

## 2024-06-08 RX ORDER — TRAZODONE HYDROCHLORIDE 50 MG/1
50 TABLET, FILM COATED ORAL
Status: DISCONTINUED | OUTPATIENT
Start: 2024-06-08 | End: 2024-06-13 | Stop reason: HOSPADM

## 2024-06-08 RX ORDER — HYDROXYZINE HYDROCHLORIDE 25 MG/1
25 TABLET, FILM COATED ORAL EVERY 4 HOURS PRN
Status: DISCONTINUED | OUTPATIENT
Start: 2024-06-08 | End: 2024-06-13 | Stop reason: HOSPADM

## 2024-06-08 RX ORDER — OLANZAPINE 10 MG/2ML
10 INJECTION, POWDER, FOR SOLUTION INTRAMUSCULAR 3 TIMES DAILY PRN
Status: DISCONTINUED | OUTPATIENT
Start: 2024-06-08 | End: 2024-06-13 | Stop reason: HOSPADM

## 2024-06-08 RX ORDER — ACETAMINOPHEN 325 MG/1
650 TABLET ORAL EVERY 4 HOURS PRN
Status: DISCONTINUED | OUTPATIENT
Start: 2024-06-08 | End: 2024-06-13 | Stop reason: HOSPADM

## 2024-06-08 RX ORDER — DIVALPROEX SODIUM 500 MG/1
500 TABLET, EXTENDED RELEASE ORAL DAILY
Status: DISCONTINUED | OUTPATIENT
Start: 2024-06-09 | End: 2024-06-08

## 2024-06-08 RX ORDER — DIVALPROEX SODIUM 250 MG/1
250 TABLET, EXTENDED RELEASE ORAL DAILY
Status: DISCONTINUED | OUTPATIENT
Start: 2024-06-08 | End: 2024-06-08

## 2024-06-08 RX ORDER — LOPERAMIDE HCL 2 MG
2 CAPSULE ORAL 4 TIMES DAILY PRN
Status: DISCONTINUED | OUTPATIENT
Start: 2024-06-08 | End: 2024-06-13 | Stop reason: HOSPADM

## 2024-06-08 RX ORDER — AMOXICILLIN 250 MG
1 CAPSULE ORAL 2 TIMES DAILY PRN
Status: DISCONTINUED | OUTPATIENT
Start: 2024-06-08 | End: 2024-06-13 | Stop reason: HOSPADM

## 2024-06-08 RX ORDER — OLANZAPINE 10 MG/1
10 TABLET ORAL 3 TIMES DAILY PRN
Status: DISCONTINUED | OUTPATIENT
Start: 2024-06-08 | End: 2024-06-13 | Stop reason: HOSPADM

## 2024-06-08 RX ORDER — DIVALPROEX SODIUM 500 MG/1
500 TABLET, EXTENDED RELEASE ORAL AT BEDTIME
Qty: 1 TABLET | Refills: 0 | Status: COMPLETED | OUTPATIENT
Start: 2024-06-08 | End: 2024-06-08

## 2024-06-08 RX ORDER — MAGNESIUM HYDROXIDE/ALUMINUM HYDROXICE/SIMETHICONE 120; 1200; 1200 MG/30ML; MG/30ML; MG/30ML
30 SUSPENSION ORAL EVERY 4 HOURS PRN
Status: DISCONTINUED | OUTPATIENT
Start: 2024-06-08 | End: 2024-06-13 | Stop reason: HOSPADM

## 2024-06-08 RX ADMIN — DIVALPROEX SODIUM 500 MG: 500 TABLET, FILM COATED, EXTENDED RELEASE ORAL at 21:43

## 2024-06-08 RX ADMIN — QUETIAPINE FUMARATE 100 MG: 50 TABLET, EXTENDED RELEASE ORAL at 21:43

## 2024-06-08 RX ADMIN — DIVALPROEX SODIUM 250 MG: 250 TABLET, DELAYED RELEASE ORAL at 08:37

## 2024-06-08 ASSESSMENT — ACTIVITIES OF DAILY LIVING (ADL)
ADLS_ACUITY_SCORE: 28
CHANGE_IN_FUNCTIONAL_STATUS_SINCE_ONSET_OF_CURRENT_ILLNESS/INJURY: NO
ADLS_ACUITY_SCORE: 28
WALKING_OR_CLIMBING_STAIRS_DIFFICULTY: NO
DRESS: INDEPENDENT
WEAR_GLASSES_OR_BLIND: NO
ADLS_ACUITY_SCORE: 29
LAUNDRY: WITH SUPERVISION
HYGIENE/GROOMING: INDEPENDENT
ADLS_ACUITY_SCORE: 29
ADLS_ACUITY_SCORE: 28
DRESS: INDEPENDENT
ADLS_ACUITY_SCORE: 29
ADLS_ACUITY_SCORE: 28
ADLS_ACUITY_SCORE: 35
FALL_HISTORY_WITHIN_LAST_SIX_MONTHS: NO
ADLS_ACUITY_SCORE: 28
ADLS_ACUITY_SCORE: 29
HYGIENE/GROOMING: INDEPENDENT
ADLS_ACUITY_SCORE: 28
ADLS_ACUITY_SCORE: 28
ADLS_ACUITY_SCORE: 29
ADLS_ACUITY_SCORE: 28
DIFFICULTY_COMMUNICATING: NO
CONCENTRATING,_REMEMBERING_OR_MAKING_DECISIONS_DIFFICULTY: NO
ADLS_ACUITY_SCORE: 28
ADLS_ACUITY_SCORE: 45
ADLS_ACUITY_SCORE: 28
ADLS_ACUITY_SCORE: 28
DRESSING/BATHING_DIFFICULTY: NO
DIFFICULTY_EATING/SWALLOWING: NO
ADLS_ACUITY_SCORE: 35
DOING_ERRANDS_INDEPENDENTLY_DIFFICULTY: NO
ADLS_ACUITY_SCORE: 28
ORAL_HYGIENE: INDEPENDENT
TOILETING_ISSUES: NO
ADLS_ACUITY_SCORE: 28
ADLS_ACUITY_SCORE: 28
ORAL_HYGIENE: INDEPENDENT
ADLS_ACUITY_SCORE: 29
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
## 2024-06-08 NOTE — PLAN OF CARE
" INITIAL PSYCHOSOCIAL ASSESSMENT AND NOTE    Information for assessment was obtained from:       [x]Patient     []Parent     []Community provider    [x]Hospital records   []Other     []Guardian       Presenting Problem:  Patient is a 28 year old male who uses he/him. Patient was admitted to [facility] on 6/6/2024 [address] voluntarily.    Presenting issues and presentation for admit: Per admitting RN on 6/8/24:       Admission Note  Legal status: Pt is here Voluntary and Consent signed.     Presenting problem: Patient is a 28 years old male admitted from [facility], arrived to unit at 0210 accompanied by security via W/C. Patient is alert and oriented x4 and is able to verbalize needs.Patient presented to ED with family and friends with the c/o worsening psychosocial stress, Suicidal ideation, Depression and behavioral change which has increased for the last 3 months. Upon arrival to unit, pt appeared tense and asking why he was transferred to inpatient without being told and why the transfer is done in the middle of the night when he should be sleeping. Pt was reassured that he is here for treatment and his concerns will be addressed in the morning. Pt stated that he can not trust anyone at this point however pt was cooperative with the search at this time.     Assessment: Patient is alert and oriented x4 and is able to verbalize needs. Appears tense and guarded with assessment questions. States he is here to resume taking his mental health medications so he can get better. Pt refused any more assessment questions stating, \"I don't even know why I am being asked all these questions which I have already answered several times before, I want to go to sleep now\". Pt is dressed in hospital clothes. Hygiene is fare with unshaven everett. Gait is stable.     Living situation: Lives with his room mate     Hx of mental health & previous hospitalization: Patient states  he has been " "having mental health issues for many years and was taking medications for it but stopped for the last 3 years and now he wants to resume back taking his medications to get better.     Hx/current medical concern: Worsening psychosocial stress, SI and Depression,      Pain: Denies pain at this time.      Hx of chemical dependency and substance abuse: Patient states \" I use THC, Cocaine, Meth and everything but I stopped all that\".     Allergies: Amoxicillin, Asa (Aspirin), Ibuprofen.      UTox: Pt tested positive for Cannabinoids per chat review.     Recommendation: Monitor pt for SI, Depression and PTSD.     Orientation to unit: Oriented to unit and his room.            The following areas have been assessed:    History of Mental Health and Chemical Dependency:  Mental Health History:  Diagnosis per consult on 6/7/24: Principal Diagnosis: Unspecified Trauma and Stressor related disorder     Secondary psychiatric diagnoses of concern this admission:  Polysubstance Use Disorder  R/O cluster B personality disorder  R/O malingering for secondary gain - possibly involving court case over dog.   Past Psychiatric Diagnosis:   Per Patient:  MDD  Bipolar  PTSD  ADHD  Polysubstance use     Patient reports he thinks he has Dissociative Identity Disorder (DID) after reading about it on the internet.  The patient has a history of suicide attempts, been admitted in the past for attempts-mostly by fentanyl or drug induced.   Patient  has a history of engaged in non-suicidal self-injury did engage in cutting in jared [place] and does burn self at times due to drug use.     Previous psychiatric hospitalizations and treatments (including outpatient, residential, and inpatient care:  Hospitalized about 3-4 times.     Substance Use History  Pt reports recent cannabis, adderall (not prescribed) and cocaine. Long history of polysubstance use.     Patient's current relationship status is   single.   Patient reported having one child(miguel). "     Family Description (Constellation, significant information and events, Family Psychiatric History):  Does not talk to anyone in family except for his uncle.    Family psychiatric history is likely due to trauma history.     Significant Medical issues, Life events or Trauma history:   Trauma history in childhood, but does not remember.     Living Situation:  Patient's current living/housing situation is staying in own home/apartment. They live with roommate and dog and they report that housing is stable and they are able to return upon discharge.       Educational Background:    Patient's highest education level was high school graduate. Patient reports they are  able to understand written materials. Patient dyslexia so some assistance maybe needed.     Occupational and Financial Status:     Patient is currently employed full time and reports they are able to function appropriately at work..  Patient reports  income is obtained through employment.  Patient does identify finances as a current stressor.     Legal Concerns (current or past history):     Might be an issue with roommate and who owns their MARCIO dog     Legal Status:    Voluntary      Service History: None     Ethnic/Cultural/Spiritual considerations:   The patient describes their cultural background as White/, heterosexual, male.  Contextual influences on patient's health include severity of symptoms, lack of social support, and level of functioning.   Patient identified their preferred language to be English. Patient reported they do not need the assistance of an .  Spiritual considerations include: estrella/polytheistic religions     Social Functioning (organizations, interests, support system):   In their free time, patient reports they like to: anything outside-fishing, hunting, camping, etc. , cooking     Patient identified friends and uncle as part of their support system.  Patient identified the quality of these  relationships as fair.       Current Treatment Providers are:  No       GOALS FOR HOSPITALIZATION:  What do patient want to accomplish during this hospitalization to make things better for the patient.?   Patient priorities:  They identified stabilizing and getting to the proper amount of medications as a goal of this hospitalization.    Social Service Assessment/Plan:  Patient view:   Would like assistance with health insurance materials from job, it was suggested we consult with business office whenever possible. As of now, Pt is uninsured so has not had consistent mental health care.       Strengths and Assets:  The patient uses these coping skills to help with stress and hard times: see above.          Patient will have psychiatric assessment and medication management by the psychiatrist. Medications will be reviewed and adjusted per DO/MD/APRN CNP as indicated. The treatment team will continue to assess and stabilize the patient's mental health symptoms with the use of medications and therapeutic programming. Hospital staff will provide a safe environment and a therapeutic milieu. Staff will continue to assess patient as needed. Patient will participate in unit groups and activities. Patient will receive individual and group support on the unit.      CTC will do individual inpatient treatment planning and after care planning. CTC will discuss options for increasing community supports with the patient. CTC will coordinate with outpatient providers and will place referrals to ensure appropriate follow up care is in place.

## 2024-06-08 NOTE — PROGRESS NOTES
Pt slept for 3.25 hours. No c/o pain or discomfort. No PRNs given. Remains on every 15 minutes safety checks. Will continue to monitor.
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
## 2024-06-08 NOTE — ED NOTES
0200 - Report given to RN on [facility], ready to accept.    0207 - Patient left unit in good condition, escorted by [facility] PA and security.  No needs or concerns at this time.

## 2024-06-08 NOTE — PROGRESS NOTES
"Admission Note  Legal status: Pt is here Voluntary and Consent signed.    Presenting problem: Patient is a 28 years old male admitted from [facility], arrived to unit at 0210 accompanied by security via W/C. Patient is alert and oriented x4 and is able to verbalize needs.Patient presented to ED with family and friends with the c/o worsening psychosocial stress, Suicidal ideation, Depression and behavioral change which has increased for the last 3 months. Upon arrival to unit, pt appeared tense and asking why he was transferred to inpatient without being told and why the transfer is done in the middle of the night when he should be sleeping. Pt was reassured that he is here for treatment and his concerns will be addressed in the morning. Pt stated that he can not trust anyone at this point however pt was cooperative with the search at this time.     Assessment: Patient is alert and oriented x4 and is able to verbalize needs. Appears tense and guarded with assessment questions. States he is here to resume taking his mental health medications so he can get better. Pt refused any more assessment questions stating, \"I don't even know why I am being asked all these questions which I have already answered several times before, I want to go to sleep now\". Pt is dressed in hospital clothes. Hygiene is fare with unshaven everett. Gait is stable.    Living situation: Lives with his room mate    Hx of mental health & previous hospitalization: Patient states  he has been having mental health issues for many years and was taking medications for it but stopped for the last 3 years and now he wants to resume back taking his medications to get better.    Hx/current medical concern: Worsening psychosocial stress, SI and Depression,     Pain: Denies pain at this time.     Hx of chemical dependency and substance abuse: Patient states \" I use THC, Cocaine, Meth and everything but I stopped all that\".    Allergies: Amoxicillin, Asa (Aspirin), " Ibuprofen.     UTox: Pt tested positive for Cannabinoids per chat review.    Recommendation: Monitor pt for SI, Depression and PTSD.     Orientation to unit: Oriented to unit and his room.

## 2024-06-09 LAB
CHOLEST SERPL-MCNC: 173 MG/DL
HDLC SERPL-MCNC: 58 MG/DL
LDLC SERPL CALC-MCNC: 78 MG/DL
NONHDLC SERPL-MCNC: 115 MG/DL
TRIGL SERPL-MCNC: 187 MG/DL

## 2024-06-09 PROCEDURE — 90853 GROUP PSYCHOTHERAPY: CPT

## 2024-06-09 PROCEDURE — 124N000002 HC R&B MH UMMC

## 2024-06-09 PROCEDURE — 36415 COLL VENOUS BLD VENIPUNCTURE: CPT | Performed by: REGISTERED NURSE

## 2024-06-09 PROCEDURE — 80061 LIPID PANEL: CPT | Performed by: REGISTERED NURSE

## 2024-06-09 PROCEDURE — 250N000013 HC RX MED GY IP 250 OP 250 PS 637: Performed by: NURSE PRACTITIONER

## 2024-06-09 PROCEDURE — 250N000013 HC RX MED GY IP 250 OP 250 PS 637: Performed by: REGISTERED NURSE

## 2024-06-09 RX ADMIN — QUETIAPINE FUMARATE 100 MG: 50 TABLET, EXTENDED RELEASE ORAL at 21:45

## 2024-06-09 RX ADMIN — DIVALPROEX SODIUM 750 MG: 500 TABLET, FILM COATED, EXTENDED RELEASE ORAL at 21:45

## 2024-06-09 ASSESSMENT — ACTIVITIES OF DAILY LIVING (ADL)
ADLS_ACUITY_SCORE: 28
DRESS: INDEPENDENT
ADLS_ACUITY_SCORE: 28
HYGIENE/GROOMING: INDEPENDENT
ADLS_ACUITY_SCORE: 28
ORAL_HYGIENE: INDEPENDENT
ADLS_ACUITY_SCORE: 28
ADLS_ACUITY_SCORE: 28

## 2024-06-09 NOTE — PROGRESS NOTES
NOC Shift Report     Pt in bed at beginning of shift, breathing quiet and unlabored. Pt slept through the shift for 7 hours. No pt complaints or concerns at this time. No PRNs given. Remains on every 15 minutes safety checks. Will continue to monitor.

## 2024-06-09 NOTE — PLAN OF CARE
RN: Pt A/O x4, VSS, except /100 at the beginning of the shift, rechecked was 133/91, Pt remained asymptomatic, denied HA, dizziness throughout shift. Pt spent most of the evening shift in the lounge watching TV, social with peers. Pt presented with full range affect, mood was calm and pleasant. Pt was medication compliant, no reported or observed side effects. Pt is very happy about the medications that start working. Pt denied all MH symptoms and contracted for safety on the unit. Pt is eating and drink adequately. Pt denied pain or physical discomfort. Pt reported no issues with bowel and bladder. Continue with POC.      Problem: Suicide Risk  Goal: Absence of Self-Harm  Intervention: Promote Psychosocial Wellbeing  Recent Flowsheet Documentation  Taken 6/8/2024 1630 by Yazmin Mejía, RN  Supportive Measures: active listening utilized   Goal Outcome Evaluation:    Plan of Care Reviewed With: patient

## 2024-06-09 NOTE — PLAN OF CARE
"RN Assessment    SI: pt endorses chronic SI thoughts but states that with his medications on board, they have lessened to just \"a whisper\" versus how bold and loud the thoughts felt prior to admission. Pt contracts for safety  SIB: denies  HI: denies  AVH: denies  Thought process: WDL  Sleep: WDL  Affect & Mood: labile-- mood irritable/angry at start of shift but brightens as the day goes on  Behavior: pt is visible in the milieu most of the shift. Pt is selectively social with peers and staff. Pt more pleasant and cooperative with care later in the shift. Pt talked a lot about his therapy dog in training at home- Javier and how the dog is learning to help him cope with mood swings and remember to take his medications, exercise, etc. No medications scheduled or requested this shift. Pt looks forward to working with financial counselor tomorrow regarding picking out his insurance package to best support his mental health care.     Possible Medication SE: none reported or observed  Hygiene: adequate   VS: elevated BP noted at 147/103, standing 132/99 with tachy pulse 118-- pt denies headache, chest pain or blurred vision. Pt agreeable to tell RN if he experiences any of these symptoms  Pain: denies  Medical Concerns: none reported or observed.   Appetite & Fluid intake: adequate   Bowel & Bladder: no issues reported      "

## 2024-06-10 LAB
ATRIAL RATE - MUSE: 78 BPM
DIASTOLIC BLOOD PRESSURE - MUSE: NORMAL MMHG
INTERPRETATION ECG - MUSE: NORMAL
P AXIS - MUSE: 55 DEGREES
PR INTERVAL - MUSE: 142 MS
QRS DURATION - MUSE: 98 MS
QT - MUSE: 362 MS
QTC - MUSE: 412 MS
R AXIS - MUSE: 58 DEGREES
SYSTOLIC BLOOD PRESSURE - MUSE: NORMAL MMHG
T AXIS - MUSE: 35 DEGREES
VENTRICULAR RATE- MUSE: 78 BPM

## 2024-06-10 PROCEDURE — G0177 OPPS/PHP; TRAIN & EDUC SERV: HCPCS

## 2024-06-10 PROCEDURE — 250N000013 HC RX MED GY IP 250 OP 250 PS 637: Performed by: REGISTERED NURSE

## 2024-06-10 PROCEDURE — 250N000013 HC RX MED GY IP 250 OP 250 PS 637: Performed by: NURSE PRACTITIONER

## 2024-06-10 PROCEDURE — 124N000002 HC R&B MH UMMC

## 2024-06-10 PROCEDURE — H2032 ACTIVITY THERAPY, PER 15 MIN: HCPCS

## 2024-06-10 PROCEDURE — 99232 SBSQ HOSP IP/OBS MODERATE 35: CPT

## 2024-06-10 RX ADMIN — QUETIAPINE FUMARATE 100 MG: 50 TABLET, EXTENDED RELEASE ORAL at 21:59

## 2024-06-10 RX ADMIN — DIVALPROEX SODIUM 750 MG: 500 TABLET, FILM COATED, EXTENDED RELEASE ORAL at 21:59

## 2024-06-10 ASSESSMENT — ACTIVITIES OF DAILY LIVING (ADL)
ADLS_ACUITY_SCORE: 28
DRESS: INDEPENDENT
ADLS_ACUITY_SCORE: 28
ORAL_HYGIENE: INDEPENDENT
ADLS_ACUITY_SCORE: 28
HYGIENE/GROOMING: INDEPENDENT
ADLS_ACUITY_SCORE: 28

## 2024-06-10 NOTE — PLAN OF CARE
BEH IP Unit Acuity Rating Score (UARS)  Patient is given one point for every criteria they meet.    CRITERIA SCORING   On a 72 hour hold, court hold, committed, stay of commitment, or revocation. 0    Patient LOS on BEH unit exceeds 20 days. 0  LOS: 2   Patient under guardianship, 55+, otherwise medically complex, or under age 11. 0   Suicide ideation without relief of precipitating factors. 1   Current plan for suicide. 1   Current plan for homicide. 0   Imminent risk or actual attempt to seriously harm another without relief of factors precipitating the attempt. 0   Severe dysfunction in daily living (ex: complete neglect for self care, extreme disruption in vegetative function, extreme deterioration in social interactions). 1   Recent (last 7 days) or current physical aggression in the ED or on unit. 0   Restraints or seclusion episode in past 72 hours. 0   Recent (last 7 days) or current verbal aggression, agitation, yelling, etc., while in the ED or unit. 0   Active psychosis. 0   Need for constant or near constant redirection (from leaving, from others, etc).  0   Intrusive or disruptive behaviors. 0   Patient requires 3 or more hours of individualized nursing care per 8-hour shift (i.e. for ADLs, meds, therapeutic interventions). 0   TOTAL 3

## 2024-06-10 NOTE — PROGRESS NOTES
Rehab Group    Start time: 1315  End time: 1415  Patient time total: 60 minutes    attended full group    #6 attended   Group Type: occupational therapy   Group Topic Covered: cognitive activities, healthy leisure time, and problem solving     Group Session Detail:  Visual-spatial group game     Patient Response/Contribution:  cooperative with task, attentive, and actively engaged     Patient Detail:    Pt actively participated in a structured occupational therapy group with a focus on visuospatial problem solving and social engagement via a group game. Pt quickly demonstrated understanding of the novel 3-step task after an initial explanation. Pt remained focused and engaged throughout the full duration of group. Strategic in his task approach, and occasionally offered assistance to peers. Calm, pleasant, and engaged. Affect appeared to brighten in interactions.         Train & Education Service Per Session 45 + Minutes () OT Group code    Patient Active Problem List   Diagnosis    Bipolar disorder (H)    Cannabis dependence (H)    Major depressive disorder, recurrent severe without psychotic features (H)

## 2024-06-10 NOTE — PLAN OF CARE
RN: Pt A/O x4, VSS, except /96,  Pt remained asymptomatic ,denied HA, dizziness. Pt spent most of the evening shift in the lounge watching TV, social with peers. Pt presented with full range affect, mood was calm and pleasant. Pt was medication compliant, no reported or observed side effects.  Pt denied all MH symptoms, including SI/SIB/HI, hallucinations, anxiety and depression. Pt contracted for safety on the unit. Pt is eating and drink adequately. Pt denied pain or physical discomfort. Pt reported no issues with bowel and bladder. Continue with POC.   Pt's BP has been high, but pt was asymptomatic for the past few days. Sticky notes left for provider to F/U.      Goal Outcome Evaluation:     Problem: Suicidal Behavior  Goal: Suicidal Behavior is Absent or Managed  Outcome: Progressing

## 2024-06-10 NOTE — PROGRESS NOTES
"PSYCHIATRY  PROGRESS NOTE     DATE OF SERVICE   06/10/24          CHIEF COMPLAINT   \" Very stabilized.\"       SUBJECTIVE   Per nursing documentation:    RN: Pt A/O x4, VSS, except /96,  Pt remained asymptomatic ,denied HA, dizziness. Pt spent most of the evening shift in the lounge watching TV, social with peers. Pt presented with full range affect, mood was calm and pleasant. Pt was medication compliant, no reported or observed side effects.  Pt denied all MH symptoms, including SI/SIB/HI, hallucinations, anxiety and depression. Pt contracted for safety on the unit. Pt is eating and drink adequately. Pt denied pain or physical discomfort. Pt reported no issues with bowel and bladder. Continue with POC.   Pt's BP has been high, but pt was asymptomatic for the past few days. Sticky notes left for provider to F/U.         NOC Shift Report     Pt in bed at beginning of shift, breathing quiet and unlabored. Pt slept through the shift for 6.75 hours. No pt complaints or concerns at this time. No PRNs given. Remains on every 15 minutes safety checks. Will continue to monitor.           Per unit CTC documentation:    -Attending Provider: DAVID Cartagena CNP  -Voicemail Code: [id number]  -Team Note Due: Tuesday  -Next Steps:     1           Assessment/Intervention/Current Symtoms and Care Coordination:  Chart review and met with team, discussed pt progress, symptomology, and response to treatment.  Discussed the discharge plan and any potential impediments to discharge.        Discharge Plan or Goal:  Pending further stabilization, continued compliance with medications, continued activities of daily living, and development of a safe discharge plan.   Considerations:      Barriers to Discharge:  Impact of mental health symptoms on well being   Impact of mental health symptoms on activities of daily living  Need for medication monitoring and medication management     Referral Status:  TBD     Legal " "Status:  Voluntary   County:   File Number:   Start and expiration date of commitment:      Contacts:        Upcoming Meetings and Dates/Important Information:        -Still Needed on AVS:                OBJECTIVE   Met with patient in interview room of unit 10 [address]. Upon patient interview, patient reports their mood as, \" very stabilized.\"  Patient's mood is observed to be slightly elevated.  Patient's affect appears blunted.  Patient's thought process is tangential and circumstantial.  Patient reports leading up to presenting to the hospital he was not on medications for the past 3 years and that in the past he has benefited from mood stabilizer Depakote as well as quetiapine for further mood stabilization and sleep promotion.  Patient reports prior to hospitalization he was experiencing intense mood swings however could not afford psychiatric medication management due to needing health insurance.  Patient reports he now has employment and is in process of establishing health insurance coverage however will need some assistance with completing this paperwork during hospitalization.  Provider discussed that plan will be to follow up with unit CTC regarding this and patient in agreement.  Patient also expresses ongoing grief related to losing custody of his daughter and ongoing psychosocial stressor related to not being able to see his daughter.  Patient does not endorse anxiety or depression symptoms.. Denies any thoughts of SI, SIB, SA, intent or plan. Denies HI. Patient able to contract for safety. Patient denies any AH or VH.  Patient did report that in the past he has experienced visual hallucinations of seeing, \"shadow people.\"  Patient denies any paranoid thoughts or delusions. Patient endorses sleeping well overnight since initiation of medication quetiapine. Patient reports appetite is stable and they are eating well and as well as drinking fluids adequately.  Denies any issue with bowel or bladder.  Patient " vital signs reviewed and patient noted to be experiencing some ongoing hypertension and elevated pulse.  Plan will be to continue to monitor this for now and consider initiation of PRN hydralazine to treat if blood pressure continues to be elevated.  Plan will also be to consult with internal medicine team if hypertension persists.  Patient does report medical concern of a persistent wart on his left thumb which has not responded to OTC treatments.  Provider discussed option of consulting with internal medicine provider regarding if this can be treated and patient in agreement with this.  Patient denies any medical concerns today. Patient has no other questions or concerns. Today, plan will be to continue current medication regimen with no changes.  Provider discussed the plan will be to recheck VPA level, CMP, and CBC on the morning of 6/13/2024.  Patient in agreement with this.       MEDICATIONS   Medications:  Scheduled Meds:  Current Facility-Administered Medications   Medication Dose Route Frequency Provider Last Rate Last Admin    divalproex sodium extended-release (DEPAKOTE ER) 24 hr tablet 750 mg  750 mg Oral At Bedtime Nafisa Akins APRN CNP   750 mg at 06/09/24 2145    QUEtiapine (SEROquel XR) 24 hr tablet 100 mg  100 mg Oral At Bedtime Zee Roque APRN CNP   100 mg at 06/09/24 2145     Continuous Infusions:  Current Facility-Administered Medications   Medication Dose Route Frequency Provider Last Rate Last Admin     PRN Meds:.  Current Facility-Administered Medications   Medication Dose Route Frequency Provider Last Rate Last Admin    acetaminophen (TYLENOL) tablet 650 mg  650 mg Oral Q4H PRN Mouna Jensenoa, DO        alum & mag hydroxide-simethicone (MAALOX) suspension 30 mL  30 mL Oral Q4H PRN Gianni Jensen, DO        hydrOXYzine HCl (ATARAX) tablet 25 mg  25 mg Oral Q4H PRN Camila, Gianni, DO        loperamide (IMODIUM) capsule 2 mg  2 mg Oral 4x Daily PRN Camila, Gianni, DO        melatonin  "tablet 5 mg  5 mg Oral At Bedtime PRN Gianni Jensen, DO        OLANZapine (zyPREXA) tablet 10 mg  10 mg Oral TID PRN Gianni Jensen,         Or    OLANZapine (zyPREXA) injection 10 mg  10 mg Intramuscular TID PRN Gianni Jensen,         senna-docusate (SENOKOT-S/PERICOLACE) 8.6-50 MG per tablet 1 tablet  1 tablet Oral BID PRN Gianni Jensen,         traZODone (DESYREL) tablet 50 mg  50 mg Oral At Bedtime PRN Gianni Jensen, DO           Medication adherence issues: MS Med Adherence Y/N: No  Medication side effects: MEDICATION SIDE EFFECTS: no side effects reported  Benefit: Yes / No: Yes       ROS   Pertinent items are noted in HPI.       MENTAL STATUS EXAM   Vitals: /82 (BP Location: Left arm, Patient Position: Sitting, Cuff Size: Adult Regular)   Pulse (!) 130   Temp 97.6  F (36.4  C) (Oral)   Resp 16   Wt 103.5 kg (228 lb 1.6 oz)   SpO2 97%   BMI 33.68 kg/m      Appearance:  Casually groomed  Mood: Reports, \"very stabilized.\"  Patient's mood is observed to be slightly elevated.  Affect: blunted  was congruent to speech  Suicidal Ideation: PRESENT / ABSENT: absent   Homicidal Ideation: PRESENT / ABSENT: absent   Thought process: circumstantial and tangential   Thought content: denies suicidal and violent ideation.   Fund of Knowledge: Average  Attention/Concentration: Easily distracted  Language ability:  Intact  Memory: Appears intact, not formally assessed  Insight: Limited  Judgement: Limited  Orientation: Yes, x4  Psychomotor Behavior: normal or unremarkable    Muscle Strength and Tone: MuscleStrength: Normal  Gait and Station: Normal       LABS   personally reviewed.      Latest Reference Range & Units 06/06/24 07:54 06/08/24 12:40 06/08/24 13:29 06/09/24 08:27   Sodium 135 - 145 mmol/L   140    Potassium 3.4 - 5.3 mmol/L   4.1    Chloride 98 - 107 mmol/L   101    Carbon Dioxide (CO2) 22 - 29 mmol/L   24    Urea Nitrogen 6.0 - 20.0 mg/dL   12.4    Creatinine 0.67 - 1.17 mg/dL   0.82    GFR Estimate " >60 mL/min/1.73m2   >90    Calcium 8.6 - 10.0 mg/dL   9.7    Anion Gap 7 - 15 mmol/L   15    Albumin 3.5 - 5.2 g/dL   4.8    Protein Total 6.4 - 8.3 g/dL   7.3    Alkaline Phosphatase 40 - 150 U/L   95    ALT 0 - 70 U/L   110 (H)    AST 0 - 45 U/L   48 (H)    Bilirubin Total <=1.2 mg/dL   0.2    Cholesterol <200 mg/dL    173   Creatinine Urine mg/dL 194      Folate 4.6 - 34.8 ng/mL   9.4    Glucose 70 - 99 mg/dL   87    HDL Cholesterol >=40 mg/dL    58   Hemoglobin A1C <5.7 %   5.5    LDL Cholesterol Calculated <=100 mg/dL    78   Non HDL Cholesterol <130 mg/dL    115   Triglycerides <150 mg/dL    187 (H)   TSH 0.30 - 4.20 uIU/mL   1.01    Vitamin B12 232 - 1,245 pg/mL   1,103    Vitamin D, Total (25-Hydroxy) 20 - 50 ng/mL   31    WBC 4.0 - 11.0 10e3/uL   12.5 (H)    Hemoglobin 13.3 - 17.7 g/dL   16.6    Hematocrit 40.0 - 53.0 %   49.3    Platelet Count 150 - 450 10e3/uL   349    RBC Count 4.40 - 5.90 10e6/uL   5.37    MCV 78 - 100 fL   92    MCH 26.5 - 33.0 pg   30.9    MCHC 31.5 - 36.5 g/dL   33.7    RDW 10.0 - 15.0 %   12.8    % Neutrophils %   63    % Lymphocytes %   25    % Monocytes %   7    % Eosinophils %   3    % Basophils %   1    Absolute Basophils 0.0 - 0.2 10e3/uL   0.1    Absolute Eosinophils 0.0 - 0.7 10e3/uL   0.3    Absolute Immature Granulocytes <=0.4 10e3/uL   0.1    Absolute Lymphocytes 0.8 - 5.3 10e3/uL   3.2    Absolute Monocytes 0.0 - 1.3 10e3/uL   0.8    % Immature Granulocytes %   1    Absolute Neutrophils 1.6 - 8.3 10e3/uL   8.0    Absolute NRBCs 10e3/uL   0.0    NRBCs per 100 WBC <1 /100   0    Amphetamine Qual Urine Screen Negative  Screen Negative      Fentanyl Qual Urine Screen Negative  Screen Negative      Cocaine Urine Screen Negative  Screen Negative      Benzodiazepine Urine Screen Negative  Screen Negative      Opiates Qualitative Urine Screen Negative  Screen Negative      PCP Urine Screen Negative  Screen Negative      Cannabinoids Qual Urine Screen Negative  Screen Positive !    "   Barbiturates Qual Urine Screen Negative  Screen Negative      EKG 12-LEAD, TRACING ONLY   Rpt (C)     (H): Data is abnormally high  !: Data is abnormal  (C): Corrected  Rpt: View report in Results Review for more information    No results found for: \"PHENYTOIN\", \"PHENOBARB\", \"VALPROATE\", \"CBMZ\"       DIAGNOSIS   Principal Problem:    Unspecified Bipolar and Related Disorder - r/o Substance-Induced Bipolar and Related Disorder vs Bipolar Disorder vs Personality Disorder  Secondary Diagnoses:   Polysubstance use disorder (cannabis, opiates)  Rule out cluster B traits    Clinically Significant Risk Factors                                       # Financial/Environmental Concerns: unable to afford medication(s)                Active Problem List:  Patient Active Problem List   Diagnosis    Bipolar disorder (H)    Cannabis dependence (H)    Major depressive disorder, recurrent severe without psychotic features (H)          HOSPITAL COURSE AND ASSESSMENT   Estevan Harris is a 28 year old male with history of  bipolar disorder, major depressive disorder, anxiety disorder, PTSD, ADHD, and polysubstance use. Patient was brought in by his roommate to [facility] ED on 6/6/2024 for reports of manic behaviors and memory loss.  On Monday, patient reportedly woke up in a pile of glass.  He does not remember the events that preceded this incident.  Patient states he \"blacks out\" when he has a manic episode.  He reports recent stressors including termination of a relationship, in which ex-partner is now attempting to gain custody of his emotional support dog.  Patient reports this situation with his ex-partner triggered his PTSD from having his daughter taken away from him 8 years ago.  Patient reports he came to the hospital for management of his bipolar disorder, as he has not taken medications for at least 3 years.  Patient reportedly has been unable to pay for his medications, as he has not previously had health insurance.  " However, patient reports he will soon be signing up for medical insurance through his work.  Patient reports he has previously taken lithium, risperidone, Seroquel, and Depakote.  While in ED, Seroquel XR 100mg was initiated for sleep and psychosis, and Depakote DR 250mg BID was initiated for mood stabilization. UDS positive for cannabinoids. Patient declined CD assessment.     At time of admission to inpatient unit 10N patient agreeable to continuing quetiapine and Depakote to target mood stabilization.       PLAN   1. Ongoing education given regarding diagnostic and treatment options with risks, benefits and alternatives and adequate verbalization of understanding.  2.  Medications:  -Continue Depakote  mg PO capsule at bedtime  -Continue quetiapine 24-hour tablet 100 mg PO capsule at bedtime to target mood stabilization and promote sleep  -PRN hydroxyzine tablet 25 mg PO every 4 hours as needed for anxiety  -PRN olanzapine tablet 10 mg PO 3 times daily as needed for agitation, ordered linked with backup olanzapine 10 mg IM injection    3.  Labs/Imaging:  -Ordered CBC, CMP, and VPA level for 6/13/2024 at 0800     4. Consults:  -Internal medicine consult placed to address wart on left thumb    5. Precautions:  -Suicide precautions    6. Legal:  -Voluntary    7. Discharge planning:  -Per unit CTC        [facility]: Patient able to contract for safety and Patient on precautions    Coordination of Care:   Treatment Plan reviewed and physician signed, Care discussed with Care/Treatment Team Members, Chart reviewed and Patient seen      Re-Certification I certify that the inpatient psychiatric facility services furnished since the previous certification were, and continue to be, medically necessary for, either, treatment which could reasonably be expected to improve the patient s condition or diagnostic study and that the hospital records indicate that the services furnished were,  either, intensive treatment services, admission and related services necessary for diagnostic study, or equivalent services.     I certify that the patient continues to need, on a daily basis, active treatment furnished directly by or requiring the supervision of inpatient psychiatric facility personnel.   I estimate 7 days of hospitalization is necessary for proper treatment of the patient. My plans for post-hospital care for this patient are   TBD     DAVID Pierce CNP    -     06/10/24       -     10:48 AM      Total time  35 minutes with > 50%spent on coordination of cares and psycho-education.    This note was created with help of Dragon dictation system. Grammatical / typing errors are not intentional.    DAVID Pierce CNP

## 2024-06-10 NOTE — PLAN OF CARE
-Attending Provider: DAVID Cartagena CNP  -Voicemail Code: [id number]  -Team Note Due: Tuesday  -Next Steps:    1        Assessment/Intervention/Current Symtoms and Care Coordination:  Chart review and met with team, discussed pt progress, symptomology, and response to treatment.  Discussed the discharge plan and any potential impediments to discharge.       Discharge Plan or Goal:  Pending further stabilization, continued compliance with medications, continued activities of daily living, and development of a safe discharge plan.   Considerations:     Barriers to Discharge:  Impact of mental health symptoms on well being   Impact of mental health symptoms on activities of daily living  Need for medication monitoring and medication management     Referral Status:  TBD     Legal Status:  Voluntary   County:   File Number:   Start and expiration date of commitment:     Contacts:        Upcoming Meetings and Dates/Important Information:      -Still Needed on AVS:

## 2024-06-10 NOTE — PLAN OF CARE
4 Eyes Skin Assessment    Ahsan Berger is being assessed upon: Admission    I agree that I, Kristin Mendoza, along with [employer] RN (either 2 RN's or 1 LPN and 1 RN) have performed a thorough Head to Toe Skin Assessment on the patient. ALL assessment sites listed below have been assessed. Areas assessed by both nurses:     [x]   Head, Face, and Ears   [x]   Shoulders, Back, and Chest  [x]   Arms, Elbows, and Hands   [x]   Coccyx, Sacrum, and Ischium  [x]   Legs, Feet, and Heels    Does the Patient have Skin Breakdown? No    Tom Prevention initiated: No  Wound Care Orders initiated: No    WO nurse consulted for Pressure Injury (Stage 3,4, Unstageable, DTI, NWPT, and Complex wounds) and New or Established Ostomies: No        Primary Nurse eSignature:  Kristin Mendoza RN on 11/25/2020 at 8:26 PM      Co-Signer eSignature: Electronically signed by CARRIE John RN on 11/26/20 at 5:27 AM CST Shift Summary ([id number])  Mental Health Status   Pt is alert and oriented x 4 (self, place,date and situation). Able to communicate needs. Speech is clear. Eye contact is appropriate. Mood is depressed with flat affect. Cooperative and polite. No delusion or hallucination noted and appears to be in touch with reality. Insight and judgement are appropriate to situation.  Pt denies SIB, AH, VH, anxiety, depression, racing or intrusive thoughts. Continues with chronic passive SI thought. Contracts for safety. Got emotional this morning thinking of his daughter who passed away few years ago.   Pt was visible in lounge social and interacting with peers. Participated in group activity.   No acute events or safety concern this shift.   Prn given: None   Physical Health Status   Moves around independently with no difficulties.   Hygiene is appropriate.   Appetite and fluid intake are adequate. Ate both breakfast and lunch.   Reported no problem with bowel and bladder.   Slept 6.75 hours last night per staff's report.   No c/o pain or discomfort.   Today's Lab orders: None  Sitting /82 & pulse 130 ( denies any signs or symptoms of tachycardia. Provider updated )   Standing /82 & pulse 133 ( denies any signs or symptoms of tachycardia. Provider updated )   Prn given: None   Continue to monitor pt's status Q 15 minutes and stabilize the patient's symptoms with the use of medications and therapeutic programming.

## 2024-06-10 NOTE — PROGRESS NOTES
NOC Shift Report     Pt in bed at beginning of shift, breathing quiet and unlabored. Pt slept through the shift for 6.75 hours. No pt complaints or concerns at this time. No PRNs given. Remains on every 15 minutes safety checks. Will continue to monitor.

## 2024-06-10 NOTE — PROGRESS NOTES
"CLINICAL NUTRITION SERVICES - ASSESSMENT NOTE     Nutrition Prescription    RECOMMENDATIONS FOR MDs/PROVIDERS TO ORDER:  None at this time    Malnutrition Status:    Patient does not meet two of the established criteria necessary for diagnosing malnutrition    Recommendations already ordered by Registered Dietitian (RD):  None at this time    Future/Additional Recommendations:  RD to sign off at this time, but will remain available by consult if new nutrition problem arises.       REASON FOR ASSESSMENT  Estevan Harris is a/an 28 year old male assessed by the dietitian for Admission Nutrition Risk Screen for positive decreased appetite and weight loss.     CLINICAL HISTORY  PMH significant for bipolar disorder, MDD, anxiety disorder, PTSD, ADHD and polysubstance use. Admitted from ED 6/6/24 due to concern for severe psychosis or ambrocio.     NUTRITION HISTORY  RD met with Chance in the milieu who initially appeared suspicious of writer but became less guarded throughout interview. Pt reports adequate appetite and intake since admission. Pt states he typically eats a pescatarian diet and doesn't like grains. Pt has excellent hydration d/t previously living in [place]. Pt doesn't enjoy the act of eating and wishes there was a pill he could take instead for nutrition. Pt declined offer of snacks or additional portions.     GI: Pt denied N/V/D/C     CURRENT NUTRITION ORDERS  Diet: Regular  Supplement: none  Intake/Tolerance: eating and drinking adequately per RN notes    LABS  ALT 110H  AST 48H    MEDICATIONS  Depakote ER, seroquel XR    ANTHROPOMETRICS  Height: 175.3 cm (5' 9\")  Most Recent Weight: 103.5 kg (228 lb 1.6 oz)    IBW: 72.7 kg   BMI: Obesity Grade I BMI 30-34.9  Weight History:   Wt Readings from Last 15 Encounters:   06/09/24 103.5 kg (228 lb 1.6 oz)   06/06/24 103.9 kg (229 lb)      07/23/15 102.1 kg (225 lb)   06/27/12 108.9 kg (240 lb) (99%, Z= 2.27)*   11/08/11 99.8 kg (220 lb) (98%, Z= 1.97)*     * Growth " percentiles are based on CDC (Boys, 2-20 Years) data.   Limited wt hx available, pt denies recent weight loss    Dosing Weight: 80 kg (adjusted)    ASSESSED NUTRITION NEEDS  Estimated Energy Needs: 2000 - 2400 kcals/day (25 - 30 kcals/kg)  Justification: Maintenance  Estimated Protein Needs: 82 - 103 grams protein/day (0.8 - 1 grams of pro/kg)  Justification: Obesity guidelines  Estimated Fluid Needs: 1 mL/kcal  Justification: Maintenance or Per Provider    PHYSICAL FINDINGS  See malnutrition section below.  Jeronimo: 22  No abnormal nutrition-related physical findings observed.     MALNUTRITION  % Intake: No decreased intake noted  % Weight Loss: Unable to assess  Subcutaneous Fat Loss: None observed  Muscle Loss: None observed  Fluid Accumulation/Edema: None noted  Malnutrition Diagnosis: Patient does not meet two of the established criteria necessary for diagnosing malnutrition    NUTRITION DIAGNOSIS  No nutrition diagnosis at this time      INTERVENTIONS  Implementation  Nutrition Education: RD role in care   Modify composition of meals/snacks - pt declined    Goals  Patient to consume % of nutritionally adequate meal trays TID, or the equivalent with supplements/snacks.     Monitoring/Evaluation  RD to sign off at this time, but will remain available by consult if new nutrition problem arises.      Mariah Sellers, MPH, RDN, LD  Behavioral Health Adult & Pediatric Dietitian  BE[facility] Clinical Dietitian Voctiffany, Teams, or Desk: [phone]  Weekend/Holiday Vocera: Weekend Holiday Clinical Dietitian [Multi Site Groups]

## 2024-06-11 PROCEDURE — 99232 SBSQ HOSP IP/OBS MODERATE 35: CPT

## 2024-06-11 PROCEDURE — 124N000002 HC R&B MH UMMC

## 2024-06-11 PROCEDURE — 250N000013 HC RX MED GY IP 250 OP 250 PS 637

## 2024-06-11 PROCEDURE — 250N000013 HC RX MED GY IP 250 OP 250 PS 637: Performed by: NURSE PRACTITIONER

## 2024-06-11 PROCEDURE — G0177 OPPS/PHP; TRAIN & EDUC SERV: HCPCS

## 2024-06-11 PROCEDURE — 250N000013 HC RX MED GY IP 250 OP 250 PS 637: Performed by: REGISTERED NURSE

## 2024-06-11 RX ADMIN — DIVALPROEX SODIUM 750 MG: 500 TABLET, FILM COATED, EXTENDED RELEASE ORAL at 22:02

## 2024-06-11 RX ADMIN — QUETIAPINE FUMARATE 100 MG: 50 TABLET, EXTENDED RELEASE ORAL at 22:01

## 2024-06-11 RX ADMIN — Medication: at 13:22

## 2024-06-11 ASSESSMENT — ACTIVITIES OF DAILY LIVING (ADL)
ADLS_ACUITY_SCORE: 28
LAUNDRY: WITH SUPERVISION
ADLS_ACUITY_SCORE: 28
ORAL_HYGIENE: INDEPENDENT
ADLS_ACUITY_SCORE: 28
HYGIENE/GROOMING: INDEPENDENT
ADLS_ACUITY_SCORE: 28
DRESS: INDEPENDENT
ADLS_ACUITY_SCORE: 28

## 2024-06-11 NOTE — PLAN OF CARE
06/11/24 1045   Individualization/Patient Specific Goals   Patient Personal Strengths appropriate judgment/decision-making;expressive of emotions;expressive of needs;motivated for recovery;medication/treatment adherence;interests/hobbies;insight into illness/situation;humor;positive attitude;resilient;tolerant   Patient Vulnerabilities adverse childhood experience(s);history of unsuccessful treatment;substance abuse/addiction   Interprofessional Rounds   Summary Eating well, sleeping well, going to groups. Taking medications. Appropriate with peers and staff.   Participants nursing;advanced practice nurse;CTC   Behavioral Team Discussion   Participants Nedra Ludwig APRN, CNP; Sheila RN, Nicko CTC   Progress Responding well to interventions   Anticipated length of stay 2 days   Continued Stay Criteria/Rationale mental health observation, medication management   Plan Return home with information on community resources.   Anticipated Discharge Disposition home or self-care     PRECAUTIONS AND SAFETY    Behavioral Orders   Procedures    Code 1 - Restrict to Unit    Code 3     To Hessel    Discontinue 1:1 attendant for suicide risk     Order Specific Question:   I have performed an in person assessment of the patient     Answer:   Based on this assessment the patient no longer requires a one on one attendant at this point in time.     Order Specific Question:   Rationale     Answer:   Patient States able to remain safe in hospital     Order Specific Question:   Rationale     Answer:   Modifications to care environment made to mitigate safety risk     Order Specific Question:   Rationale     Answer:   Routine observations are sufficient to monitor safety.    Routine Programming     As clinically indicated    Status 15     Every 15 minutes.    Suicide precautions: Suicide Risk: HIGH     Patients on Suicide Precautions should have a Combination Diet ordered that includes a Diet selection(s) AND a Behavioral Tray  selection for Safe Tray - with utensils, or Safe Tray - NO utensils       Order Specific Question:   Suicide Risk     Answer:   HIGH       Safety  Safety WDL: WDL  Patient Location: hallway  Observed Behavior: calm  Diversional Activity: music, television  Suicidality: Status 15, Behavioral scrubs (pajamas), Minimal furniture in room, Minimal personal belongings in room

## 2024-06-11 NOTE — PLAN OF CARE
RN: Pt A/O x4, attended group this shift. Pt continues to have abnormal BP and HR, but pt remained asymptomatic , denied HA, dizziness, palpitation or SOB. Pt spent most of the evening shift walking in the [place] with head phone on, social with peers. Pt presented with full range affect, mood was calm and pleasant. Pt was medication compliant, no reported or observed side effects.  Pt denied all MH symptoms, including SI/SIB/HI, hallucinations, anxiety and depression. Pt contracted for safety on the unit. Pt is eating and drink adequately, denied pain or physical discomfort. Pt reported no issues with bowel and bladder. Continue with POC.     Poblem: Anxiety Signs/Symptoms  Goal: Enhanced Social, Occupational or Functional Skills (Anxiety Signs/Symptoms)  Intervention: Promote Social, Occupational and Functional Ability  Recent Flowsheet Documentation  Taken 6/10/2024 1700 by Yazmin Mejía, RN  Trust Relationship/Rapport: care explained   Goal Outcome Evaluation:    Plan of Care Reviewed With: patient

## 2024-06-11 NOTE — PROGRESS NOTES
"PSYCHIATRY  PROGRESS NOTE     DATE OF SERVICE   06/11/24          CHIEF COMPLAINT   \" Pretty good.\"       SUBJECTIVE   Per nursing documentation:    RN: Pt A/O x4, attended group this shift. Pt continues to have abnormal BP and HR, but pt remained asymptomatic , denied HA, dizziness, palpitation or SOB. Pt spent most of the evening shift walking in the [place] with head phone on, social with peers. Pt presented with full range affect, mood was calm and pleasant. Pt was medication compliant, no reported or observed side effects.  Pt denied all MH symptoms, including SI/SIB/HI, hallucinations, anxiety and depression. Pt contracted for safety on the unit. Pt is eating and drink adequately, denied pain or physical discomfort. Pt reported no issues with bowel and bladder. Continue with POC.      Pt in bed at beginning of shift, breathing quiet and unlabored. Pt slept through the shift for 7 hours. No pt complaints or concerns at this time. No PRNs given. Remains on every 15 minutes safety checks. Will continue to monitor.     Per unit CTC documentation:         -Attending Provider: DAVID Cartagena CNP  -Voicemail Code: [id number]  -Team Note Due: Tuesday  -Next Steps:     Update discharge instructions to include 2 clinics near him that provide therapy.  Follow up on scheduling a psych appointment.            Assessment/Intervention/Current Symtoms and Care Coordination:  Chart review and met with team, discussed pt progress, symptomology, and response to treatment.  Discussed the discharge plan and any potential impediments to discharge.  SALAZAR met with Chance to complete health insurance paperwork for his new job. SALAZAR assessed for community resource needs. Chance identified that he would like to be set up with a psychiatrist, and have information on 2 different therapy clinics that he can reach out to when he discharges.   SALAZAR called [facility] to schedule a psych appointment for after discharge. SALAZAR was " "informed they were unable to schedule appointment due to patient owing $150. CTC was informed if that was paid, he would be able to schedule appointment.   [facility] discussed this with patient. Patient stated he could pay that amount if he could access his wallet. [facility] explored other potential psych providers, and patient informed CTC he also owes money to other providers.   [facility] messaged provider asking if it was possible to have an order for him to access his wallet to get his debit card to pay the outstanding balance.Provider stated she would put that order in.       Discharge Plan or Goal:  Pending further stabilization, continued compliance with medications, continued activities of daily living, and development of a safe discharge plan.   Considerations:      Barriers to Discharge:  Impact of mental health symptoms on well being   Impact of mental health symptoms on activities of daily living  Need for medication monitoring and medication management     Referral Status:  TBD     Legal Status:  Voluntary   County:   File Number:   Start and expiration date of commitment:      Contacts:        Upcoming Meetings and Dates/Important Information:        -Still Needed on AVS:                   OBJECTIVE   Met with patient in interview room of unit 10 [address]. Upon patient interview, patient reports their mood as, \" pretty good.\"  Patient's mood is observed to be slightly elevated however improving.  Patient's affect appears blunted.  Patient's thought process is slightly tangential and circumstantial however more organized and goal oriented today.  Patient reports that he feels his mood is stabilizing since initiation of Depakote as well as quetiapine.  Patient denies any depression symptoms however continues to endorse ongoing \"sadness and grief\" which she reports are situational and related to losing custody of his daughter and ongoing psychosocial stressor related to not being able to see his daughter.  Patient does endorse " "that last night he had a bizarre dream however denies any nightmares.  Patient denies any anxiety symptoms however reports feeling, \"healthy anxiety.\"  Patient does not elaborate on this and reports this as normal and tolerable for him.  Patient denies any thoughts of SI, SIB, SA, intent or plan. Denies HI. Patient able to contract for safety. Patient denies any AH or VH.  Patient denies any paranoid thoughts or delusions. Patient endorses sleeping well overnight since initiation of medication quetiapine.  Patient denies any side effects of current medication regimen however does endorse that he, \"sleeps really heavy\" since initiation of quetiapine.  Patient reports appetite is stable and they are eating well and as well as drinking fluids adequately.  Denies any issue with bowel or bladder.  Patient vital signs reviewed and patient noted to be experiencing some ongoing hypertension and elevated pulse however this is improving today.  Plan will be to continue to monitor vital signs closely for now and consider initiation of PRN hydralazine to treat if blood pressure continues to be elevated.  Plan will also be to consult with internal medicine team if hypertension worsens.  Patient does report medical concern of a persistent wart on his left thumb which has not responded to OTC treatments.  Internal medicine provider recommended starting salicylic acid 17% topical gel to treat wart and patient in agreement to trial this.  Patient denies any medical concerns today. Patient has no other questions or concerns. Today, plan will be to continue current medication regimen with no changes.  Provider discussed the plan will be to recheck VPA level, CMP, and CBC on the morning of 6/13/2024.  Patient in agreement with this.  Patient expresses that he hopes to discharge later this week.       MEDICATIONS   Medications:  Scheduled Meds:  Current Facility-Administered Medications   Medication Dose Route Frequency Provider Last " "Rate Last Admin    divalproex sodium extended-release (DEPAKOTE ER) 24 hr tablet 750 mg  750 mg Oral At Bedtime Nafisa Akins, APRN CNP   750 mg at 06/10/24 2159    QUEtiapine (SEROquel XR) 24 hr tablet 100 mg  100 mg Oral At Bedtime Zee Roque, APRN CNP   100 mg at 06/10/24 2159     Continuous Infusions:  Current Facility-Administered Medications   Medication Dose Route Frequency Provider Last Rate Last Admin     PRN Meds:.  Current Facility-Administered Medications   Medication Dose Route Frequency Provider Last Rate Last Admin    acetaminophen (TYLENOL) tablet 650 mg  650 mg Oral Q4H PRN Gianni Jensen, DO        alum & mag hydroxide-simethicone (MAALOX) suspension 30 mL  30 mL Oral Q4H PRN Gianni Jensen, DO        hydrOXYzine HCl (ATARAX) tablet 25 mg  25 mg Oral Q4H PRN Camila, Gianni, DO        loperamide (IMODIUM) capsule 2 mg  2 mg Oral 4x Daily PRN Gianni Jensen, DO        melatonin tablet 5 mg  5 mg Oral At Bedtime PRN Gianni Jensen, DO        OLANZapine (zyPREXA) tablet 10 mg  10 mg Oral TID PRN Gianni Jensen,         Or    OLANZapine (zyPREXA) injection 10 mg  10 mg Intramuscular TID PRN Gianni Jensen, DO        senna-docusate (SENOKOT-S/PERICOLACE) 8.6-50 MG per tablet 1 tablet  1 tablet Oral BID PRN Gianni Jensen, DO        traZODone (DESYREL) tablet 50 mg  50 mg Oral At Bedtime PRN Gianni Jensen, DO           Medication adherence issues: MS Med Adherence Y/N: No  Medication side effects: MEDICATION SIDE EFFECTS: no side effects reported  Benefit: Yes / No: Yes       ROS   Pertinent items are noted in HPI.       MENTAL STATUS EXAM   Vitals: BP (!) 134/93   Pulse 105   Temp 98.3  F (36.8  C) (Oral)   Resp 16   Wt 103.5 kg (228 lb 1.6 oz)   SpO2 100%   BMI 33.68 kg/m      Appearance:  Casually groomed  Mood: Reports, \"pretty good.\"  Patient's mood is observed to be slightly elevated however improving.  Affect: blunted  was congruent to speech  Suicidal Ideation: PRESENT / ABSENT: absent "   Homicidal Ideation: PRESENT / ABSENT: absent   Thought process: circumstantial and tangential however improving  Thought content: denies suicidal and violent ideation.   Fund of Knowledge: Average  Attention/Concentration: Easily distracted  Language ability:  Intact  Memory: Appears intact, not formally assessed  Insight: Limited  Judgement: Limited  Orientation: Yes, x4  Psychomotor Behavior: normal or unremarkable    Muscle Strength and Tone: MuscleStrength: Normal  Gait and Station: Normal       LABS   personally reviewed.      Latest Reference Range & Units 06/06/24 07:54 06/08/24 12:40 06/08/24 13:29 06/09/24 08:27   Sodium 135 - 145 mmol/L   140    Potassium 3.4 - 5.3 mmol/L   4.1    Chloride 98 - 107 mmol/L   101    Carbon Dioxide (CO2) 22 - 29 mmol/L   24    Urea Nitrogen 6.0 - 20.0 mg/dL   12.4    Creatinine 0.67 - 1.17 mg/dL   0.82    GFR Estimate >60 mL/min/1.73m2   >90    Calcium 8.6 - 10.0 mg/dL   9.7    Anion Gap 7 - 15 mmol/L   15    Albumin 3.5 - 5.2 g/dL   4.8    Protein Total 6.4 - 8.3 g/dL   7.3    Alkaline Phosphatase 40 - 150 U/L   95    ALT 0 - 70 U/L   110 (H)    AST 0 - 45 U/L   48 (H)    Bilirubin Total <=1.2 mg/dL   0.2    Cholesterol <200 mg/dL    173   Creatinine Urine mg/dL 194      Folate 4.6 - 34.8 ng/mL   9.4    Glucose 70 - 99 mg/dL   87    HDL Cholesterol >=40 mg/dL    58   Hemoglobin A1C <5.7 %   5.5    LDL Cholesterol Calculated <=100 mg/dL    78   Non HDL Cholesterol <130 mg/dL    115   Triglycerides <150 mg/dL    187 (H)   TSH 0.30 - 4.20 uIU/mL   1.01    Vitamin B12 232 - 1,245 pg/mL   1,103    Vitamin D, Total (25-Hydroxy) 20 - 50 ng/mL   31    WBC 4.0 - 11.0 10e3/uL   12.5 (H)    Hemoglobin 13.3 - 17.7 g/dL   16.6    Hematocrit 40.0 - 53.0 %   49.3    Platelet Count 150 - 450 10e3/uL   349    RBC Count 4.40 - 5.90 10e6/uL   5.37    MCV 78 - 100 fL   92    MCH 26.5 - 33.0 pg   30.9    MCHC 31.5 - 36.5 g/dL   33.7    RDW 10.0 - 15.0 %   12.8    % Neutrophils %   63    %  "Lymphocytes %   25    % Monocytes %   7    % Eosinophils %   3    % Basophils %   1    Absolute Basophils 0.0 - 0.2 10e3/uL   0.1    Absolute Eosinophils 0.0 - 0.7 10e3/uL   0.3    Absolute Immature Granulocytes <=0.4 10e3/uL   0.1    Absolute Lymphocytes 0.8 - 5.3 10e3/uL   3.2    Absolute Monocytes 0.0 - 1.3 10e3/uL   0.8    % Immature Granulocytes %   1    Absolute Neutrophils 1.6 - 8.3 10e3/uL   8.0    Absolute NRBCs 10e3/uL   0.0    NRBCs per 100 WBC <1 /100   0    Amphetamine Qual Urine Screen Negative  Screen Negative      Fentanyl Qual Urine Screen Negative  Screen Negative      Cocaine Urine Screen Negative  Screen Negative      Benzodiazepine Urine Screen Negative  Screen Negative      Opiates Qualitative Urine Screen Negative  Screen Negative      PCP Urine Screen Negative  Screen Negative      Cannabinoids Qual Urine Screen Negative  Screen Positive !      Barbiturates Qual Urine Screen Negative  Screen Negative      EKG 12-LEAD, TRACING ONLY   Rpt (C)     (H): Data is abnormally high  !: Data is abnormal  (C): Corrected  Rpt: View report in Results Review for more information    No results found for: \"PHENYTOIN\", \"PHENOBARB\", \"VALPROATE\", \"CBMZ\"       DIAGNOSIS   Principal Problem:    Unspecified Bipolar and Related Disorder - r/o Substance-Induced Bipolar and Related Disorder vs Bipolar Disorder vs Personality Disorder  Secondary Diagnoses:   Polysubstance use disorder (cannabis, opiates)  Rule out cluster B traits    Clinically Significant Risk Factors                                       # Financial/Environmental Concerns: unable to afford medication(s)                Active Problem List:  Patient Active Problem List   Diagnosis    Bipolar disorder (H)    Cannabis dependence (H)    Major depressive disorder, recurrent severe without psychotic features (H)          HOSPITAL COURSE AND ASSESSMENT   Estevan Harris is a 28 year old male with history of  bipolar disorder, major depressive disorder, " "anxiety disorder, PTSD, ADHD, and polysubstance use. Patient was brought in by his roommate to [facility] ED on 6/6/2024 for reports of manic behaviors and memory loss.  On Monday, patient reportedly woke up in a pile of glass.  He does not remember the events that preceded this incident.  Patient states he \"blacks out\" when he has a manic episode.  He reports recent stressors including termination of a relationship, in which ex-partner is now attempting to gain custody of his emotional support dog.  Patient reports this situation with his ex-partner triggered his PTSD from having his daughter taken away from him 8 years ago.  Patient reports he came to the hospital for management of his bipolar disorder, as he has not taken medications for at least 3 years.  Patient reportedly has been unable to pay for his medications, as he has not previously had health insurance.  However, patient reports he will soon be signing up for medical insurance through his work.  Patient reports he has previously taken lithium, risperidone, Seroquel, and Depakote.  While in ED, Seroquel XR 100mg was initiated for sleep and psychosis, and Depakote DR 250mg BID was initiated for mood stabilization. UDS positive for cannabinoids. Patient declined CD assessment.     At time of admission to inpatient unit 10N patient agreeable to continuing quetiapine and Depakote to target mood stabilization.       PLAN   1. Ongoing education given regarding diagnostic and treatment options with risks, benefits and alternatives and adequate verbalization of understanding.  2.  Medications:  -Continue Depakote  mg PO capsule at bedtime  -Continue quetiapine 24-hour tablet 100 mg PO capsule at bedtime to target mood stabilization and promote sleep  -PRN hydroxyzine tablet 25 mg PO every 4 hours as needed for anxiety  -PRN olanzapine tablet 10 mg PO 3 times daily as needed for agitation, ordered linked with backup olanzapine 10 mg IM injection    3.  " Labs/Imaging:  -Ordered CBC, CMP, and VPA level for 6/13/2024 at 0800     4. Consults:  -Consult hospitalist as needed    5. Precautions:  -Suicide precautions    6. Legal:  -Voluntary    7. Discharge planning:  -Per unit CTC        Risk Assessment: [facility] RISK ASSESSMENT: Patient able to contract for safety and Patient on precautions    Coordination of Care:   Treatment Plan reviewed and physician signed, Care discussed with Care/Treatment Team Members, Chart reviewed and Patient seen      Re-Certification I certify that the inpatient psychiatric facility services furnished since the previous certification were, and continue to be, medically necessary for, either, treatment which could reasonably be expected to improve the patient s condition or diagnostic study and that the hospital records indicate that the services furnished were, either, intensive treatment services, admission and related services necessary for diagnostic study, or equivalent services.     I certify that the patient continues to need, on a daily basis, active treatment furnished directly by or requiring the supervision of inpatient psychiatric facility personnel.   I estimate 7 days of hospitalization is necessary for proper treatment of the patient. My plans for post-hospital care for this patient are   TBD     DAVID Pierce CNP    -     06/11/24       -     12:17 PM    Total time  35 minutes with > 50%spent on coordination of cares and psycho-education.    This note was created with help of Dragon dictation system. Grammatical / typing errors are not intentional.    DAVID Pierce CNP

## 2024-06-11 NOTE — PLAN OF CARE
BEH IP Unit Acuity Rating Score (UARS)  Patient is given one point for every criteria they meet.    CRITERIA SCORING   On a 72 hour hold, court hold, committed, stay of commitment, or revocation. 0    Patient LOS on BEH unit exceeds 20 days. 0  LOS: 3   Patient under guardianship, 55+, otherwise medically complex, or under age 11. 0   Suicide ideation without relief of precipitating factors. 1   Current plan for suicide. 0   Current plan for homicide. 0   Imminent risk or actual attempt to seriously harm another without relief of factors precipitating the attempt. 0   Severe dysfunction in daily living (ex: complete neglect for self care, extreme disruption in vegetative function, extreme deterioration in social interactions). 1   Recent (last 7 days) or current physical aggression in the ED or on unit. 0   Restraints or seclusion episode in past 72 hours. 0   Recent (last 7 days) or current verbal aggression, agitation, yelling, etc., while in the ED or unit. 0   Active psychosis. 0   Need for constant or near constant redirection (from leaving, from others, etc).  0   Intrusive or disruptive behaviors. 0   Patient requires 3 or more hours of individualized nursing care per 8-hour shift (i.e. for ADLs, meds, therapeutic interventions). 0   TOTAL 2

## 2024-06-11 NOTE — PROGRESS NOTES
Rehab Group    Start time: 1600  End time: 1650  Patient time total: 35 minutes    attended partial group    #5 attended   Group Type: recreation   Group Topic Covered: healthy leisure time       Group Session Detail:  TR leisure group     Patient Response/Contribution:  positive affect, cooperative with task, and actively engaged       Patient Detail:    Pt attended the structured Therapeutic Recreation group, participating in a group activity. Pt participated in a leisure activity with social engagement to gain self-esteem, manage behaviors, improve social skills, decrease isolation, and reduce anxiety/depression.   Pt remained focused and engaged throughout group activity.  Pt was sociable and was appropriate with interactions with the others in group. Pt was an active participant, contributing to the clues and descriptions throughout the activity.         Activity Therapy Per 15 minutes () Other Rehab therapies    Patient Active Problem List   Diagnosis    Bipolar disorder (H)    Cannabis dependence (H)    Major depressive disorder, recurrent severe without psychotic features (H)

## 2024-06-11 NOTE — PROGRESS NOTES
"  Rehab Group    Start time: 13:30  End time: 14:15  Patient time total: 45 minutes    Arrived a few minutes late but attended full group    #6 attended   Group Type: life skills   Group Topic Covered: coping skills, educational support, mindfulness, and social skills     Group Session Detail:  OT facilitated a group discussion and education on the topic of mental health stigma, shame & guilt, and empathy & self-compassion. OT facilitated a discussion on stigma surrounding mental health in culture / history, providing examples of historical and modern cultural figures who have faced mental illness. OT provided education on shame vs guilt (I am bad (unproductive feeling) vs I did something bad (productive feeling)). OT provided education on Radha Walker s  Shame Resilience Theory  which states that we all have the ability to move through shame constructively and grow from experiences. This theory also notes that self-compassion and empathy create an environment where shame cannot grow. OT encouraged patients to share how they practice self-compassion and provided exercise ideas.      Patient Response/Contribution:  cooperative with task, listened actively, expressed understanding of topic, and actively engaged     Patient Detail:    Pt shared during this discussion that his favorite way to practice self-compassion is to spend time in his happy place, out in nature fishing on the water. Pt shared how this supports his mental health, but that he is working on trying to figure out other strategies for self-compassion or empathy outside of this. Pt shared appropriate comments during discussion about mental health stigma, noting \"you never know what someone else is going through.\"         Train & Education Service Per Session 45 + Minutes () OT Group code    Patient Active Problem List   Diagnosis    Bipolar disorder (H)    Cannabis dependence (H)    Major depressive disorder, recurrent severe without psychotic " features (H)

## 2024-06-11 NOTE — PLAN OF CARE
RN Shift Summary     Patient presented with a bright/calm affect. He was present in the milieu throughout the shift and social with peers. He attended group today. He went out to the [employer] with staff and peers. He joked with staff and enjoyed talking about fishing. Wart cream applied to left thumb. Patient showed writer large bruise on left antecubital fossa, which he stated was new since having a blood draw in the last few days. Writer advised that patient continue to monitor the bruise and show it to his nursing staff for signs of changes. Patient is hoping to discharge later in the week. Patient did not have any scheduled medications this shift; no PRNs requested or indicated. Patient denies safety or physical concerns at this time.    Vitals: B/P: 134/93, T: 98.3, P: 105, R: 16

## 2024-06-11 NOTE — PROGRESS NOTES
"  Rehab Group    Start time: 10:15  End time: 11:00  Patient time total: 45 minutes    attended full group     #3 attended   Group Type: general health and coping and self-care   Group Topic Covered: activity therapy, coping skills, healthy leisure time, and social skills     Group Session Detail:  OT facilitated a leisure / exercise group outside in the [place]. OT provided education on the benefits of being out in nature and in sunlight (release of serotonin), facilitated conversation on general benefits of being outside to mental health. OT facilitated a group large game to provide opportunity for exercise and social/leisure engagement. OT completed clinical observation of social, cognitive, and kinesthetic performance skills.      Patient Response/Contribution:  positive affect, organized, supportive of peers, and actively engaged     Patient Detail:    Pt appeared excited, bright, alert to be outside in the [place]. Pt engaged in activity and focused well. Able to name the benefits of being outside and why the activity was good for his mental health (\"requires total focus\"). Pt was socially appropriate, attentive. Engaged socially with select peer after activity ended.         Train & Education Service Per Session 45 + Minutes () OT Group code    Patient Active Problem List   Diagnosis    Bipolar disorder (H)    Cannabis dependence (H)    Major depressive disorder, recurrent severe without psychotic features (H)       "

## 2024-06-11 NOTE — PROGRESS NOTES
Rehab Group    Start time: 11:30  End time: 12:15  Patient time total: 45 minutes    attended full group    #9 attended   Group Type: OT Clinic   Group Topic Covered: balanced lifestyle, coping skills, healthy leisure time, mindfulness, and social skills     Group Session Detail:  Pt actively participated in occupational therapy clinic to facilitate coping skill exploration, creative expression within personally meaningful activities, and clinical observation of social, cognitive, and kinesthetic performance skills.      Patient Response/Contribution:  cooperative with task, organized, and attentive     Patient Detail:    Patient presented with a euthymic mood and calm/pleasant affect. Presented with good hygiene, had just showered. Patient was independent to initiate/decide on a project, gather materials, sequence/plan, adjust to workspace demands as needed. Asked if we had the materials to make jig lures, something he uses for fishing. Able to adjust to materials we have. Social with select peer. Demonstrated good focus.         Train & Education Service Per Session 45 + Minutes () OT Group code    Patient Active Problem List   Diagnosis    Bipolar disorder (H)    Cannabis dependence (H)    Major depressive disorder, recurrent severe without psychotic features (H)

## 2024-06-11 NOTE — PLAN OF CARE
-Attending Provider: DAVID Cartagena CNP  -Voicemail Code: [phone]  -Team Note Due: Tuesday  -Next Steps:    Update discharge instructions to include 2 clinics near him that provide therapy.  Follow up on scheduling a psych appointment.         Assessment/Intervention/Current Symtoms and Care Coordination:  Chart review and met with team, discussed pt progress, symptomology, and response to treatment.  Discussed the discharge plan and any potential impediments to discharge.  [facility] met with Chance to complete health insurance paperwork for his new job. [facility] assessed for community resource needs. Chance identified that he would like to be set up with a psychiatrist, and have information on 2 different therapy clinics that he can reach out to when he discharges.   [facility] called [facility] to schedule a psych appointment for after discharge. [facility] was informed they were unable to schedule appointment due to patient owing $150. [facility] was informed if that was paid, he would be able to schedule appointment.   [facility] discussed this with patient. Patient stated he could pay that amount if he could access his wallet. [facility] explored other potential psych providers, and patient informed [facility] he also owes money to other providers.   [facility] messaged provider asking if it was possible to have an order for him to access his wallet to get his debit card to pay the outstanding balance.Provider stated she would put that order in.       Discharge Plan or Goal:  Pending further stabilization, continued compliance with medications, continued activities of daily living, and development of a safe discharge plan.   Considerations:     Barriers to Discharge:  Impact of mental health symptoms on well being   Impact of mental health symptoms on activities of daily living  Need for medication monitoring and medication management     Referral Status:  TBD     Legal Status:  Voluntary   County:   File Number:   Start and expiration date of  commitment:     Contacts:        Upcoming Meetings and Dates/Important Information:      -Still Needed on AVS:

## 2024-06-12 LAB
CANNABINOIDS UR CFM-MCNC: 1538 NG/ML
CARBOXYTHC/CREAT UR: 793 NG/MG CREAT

## 2024-06-12 PROCEDURE — 124N000002 HC R&B MH UMMC

## 2024-06-12 PROCEDURE — 250N000013 HC RX MED GY IP 250 OP 250 PS 637: Performed by: NURSE PRACTITIONER

## 2024-06-12 PROCEDURE — 250N000013 HC RX MED GY IP 250 OP 250 PS 637: Performed by: REGISTERED NURSE

## 2024-06-12 PROCEDURE — 99232 SBSQ HOSP IP/OBS MODERATE 35: CPT

## 2024-06-12 PROCEDURE — G0177 OPPS/PHP; TRAIN & EDUC SERV: HCPCS

## 2024-06-12 RX ADMIN — DIVALPROEX SODIUM 750 MG: 500 TABLET, FILM COATED, EXTENDED RELEASE ORAL at 21:49

## 2024-06-12 RX ADMIN — Medication: at 10:21

## 2024-06-12 RX ADMIN — QUETIAPINE FUMARATE 100 MG: 50 TABLET, EXTENDED RELEASE ORAL at 21:49

## 2024-06-12 ASSESSMENT — ACTIVITIES OF DAILY LIVING (ADL)
ADLS_ACUITY_SCORE: 28
HYGIENE/GROOMING: INDEPENDENT
LAUNDRY: WITH SUPERVISION
ADLS_ACUITY_SCORE: 28
ORAL_HYGIENE: INDEPENDENT
ADLS_ACUITY_SCORE: 28
DRESS: INDEPENDENT
ADLS_ACUITY_SCORE: 28
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
## 2024-06-12 NOTE — PLAN OF CARE
RN Shift Summary     Patient presented with a calm affect. He was present in the milieu and attended groups. He was social with peers and staff. He denies SI/SIB/HI. No psychotic or paranoid symptoms observed by writer. Wart gel applied by writer; he feels it is healing well. He was able to pay some bills using his credit card, per order. He is hoping to discharge tomorrow.    While searching for patient's belongings, patient's credit card/wallet were found in the locker on the unit, rather than sent to security. Security envelope created and to be sent to security. Belongings list created.    Vitals: B/P: 125/92, T: 98.3, P: 94, R: 16

## 2024-06-12 NOTE — PROGRESS NOTES
Rehab Group    Start time: 11:15  End time: 12:00  Patient time total: 45 minutes    attended full group    #5 attended   Group Type: general health and coping and relaxation   Group Topic Covered: coping skills and mindfulness     Group Session Detail:  Group covered strategies for mindfulness and grounding. Reviewed definitions of both, emphasizing importance of presence in the moment, accepting of feelings/sensations/thoughts/surroundings without judgement. OT provided education on a variety of strategies to practice mindfulness: 72674 technique (sensory), categories, body awareness, mental exercises, mindful observations, guided visualizations, mindful listening. Provided education on diaphragmatic breathing, box breathing, progressive muscle relaxation, and guided imagery.      Patient Response/Contribution:  cooperative with task, organized, socially appropriately, and actively engaged     Patient Detail:    Pt engaged well in this activity. Bright mood. Good insight to education provided and how he has previously applied it. Shared that he uses a body scan/progressive muscle relaxation exercise to support falling asleep.           Train & Education Service Per Session 45 + Minutes () OT Group code    Patient Active Problem List   Diagnosis    Bipolar disorder (H)    Cannabis dependence (H)    Major depressive disorder, recurrent severe without psychotic features (H)

## 2024-06-12 NOTE — PLAN OF CARE
-Attending Provider: DAVID Cartagena CNP  -Voicemail Code: [phone]  -Team Note Due: Tuesday  -Next Steps:    Update discharge instructions to include 2 clinics near him that provide therapy.          Assessment/Intervention/Current Symtoms and Care Coordination:  Chart review and met with team, discussed pt progress, symptomology, and response to treatment.  Discussed the discharge plan and any potential impediments to discharge.  [facility] met with Chance to call [facility] so Chance could pay his past due amount and schedule a psych appointment. [facility] obtained Chance's debit card from his folder that had an envelope with his debit card in it. Chance was able to pay his outstanding balance and schedule an inperson psych appointment for July 10th at 2pm. [facility] discussed case management referral with Chance. Chance stated he would like to think about whether or not he wanted to work with a . [facility] returned debit card to envelope that is in paper chart.     Discharge Plan or Goal:  Pending further stabilization, continued compliance with medications, continued activities of daily living, and development of a safe discharge plan.   Considerations:     Barriers to Discharge:  Impact of mental health symptoms on well being   Impact of mental health symptoms on activities of daily living  Need for medication monitoring and medication management     Referral Status:  TBD     Legal Status:  Voluntary   County:   File Number:   Start and expiration date of commitment:     Contacts:        Upcoming Meetings and Dates/Important Information:      -Still Needed on AVS:

## 2024-06-12 NOTE — PROGRESS NOTES
Rehab Group    Start time: 10:15  End time: 11:10  Patient time total: 55 minutes    attended full group    #9 attended   Group Type: OT Clinic   Group Topic Covered: balanced lifestyle, coping skills, healthy leisure time, and social skills     Group Session Detail:  Pt actively participated in occupational therapy clinic to facilitate coping skill exploration, creative expression within personally meaningful activities, and clinical observation of social, cognitive, and kinesthetic performance skills.      Patient Response/Contribution:  cooperative with task, organized, socially appropriately, and actively engaged     Patient Detail:    Patient presented with a bright mood and calm/pleasant affect. Patient was independent to initiate/decide on a project, gather materials, sequence/plan, adjust to workspace demands as needed. Social with peers/staff and appropriate in interactions. Talks with peers about movies, favorite foods. Shares that he has an extensive interest in cooking.           Train & Education Service Per Session 45 + Minutes () OT Group code    Patient Active Problem List   Diagnosis    Bipolar disorder (H)    Cannabis dependence (H)    Major depressive disorder, recurrent severe without psychotic features (H)

## 2024-06-12 NOTE — PLAN OF CARE
"Individual Therapy Note      Date of Service: June 12, 2024    Patient: Estevan goes by \"Estevan,\" uses he/him pronouns    Individuals Present: Estevan & Tamara Pryor, [facility]    Session start: 1340  Session end: 1350  Session duration in minutes: 10      Modality Used: Person Centered, Motivational Interviewing, and Solution Focused    Goals: Verbal processing    Patient Description of current symptoms: Doing ok     Mental Status Exam:   Attitude: evasive  Eye Contact: good  Mood: anxious  Affect: mood congruent and intensity is blunted  Speech: clear, coherent  Psychomotor Behavior: no evidence of tardive dyskinesia, dystonia, or tics  Thought Process:  logical, linear, and goal oriented  Associations: no loose associations  Thought Content: no evidence of suicidal ideation or homicidal ideation, no evidence of psychotic thought, no auditory hallucinations present, and no visual hallucinations present  Insight: fair  Judgement: fair  Attention Span and Concentration: intact    Pt progress: Met pt in room and asked if he'd like to check in. Pt was not sure if he did but shared he's wanting to find a therapist after he leaves to deal with childhood trauma and childhood \"SA\" and wanted to know how to find a good therapist. Writer shared that he can search for therapists who deal with certain things or therapist with certain interventions, then when he meets one can vet out if it seems like it will be a good match. Pt kept saying he wanted a therapist that has a specific way to treat people. Writer explained that there are many effective ways to treat mental health and to keep an open mind b/c there are many ways to approach therapy. Writer continued to say he wants to have a therapist with a specific approach. When asked what he wanted out of a therapist pt didn't know and didn't know what kind of therapy he was looking for. Writer shared that it seemed like he was pushing back about therapy and wondered why. Pt " shared he wasn't pushing back but just wants a specific therapy/therapist. Writer attempted to mirror back that it would be hard to find a specific therapist or approach if he wasn't sure what kind of therapist or approach he wanted. Pt shut down the conversation after this.     Treatment Objective(s) Addressed:   The focus of this session was on rapport building and identifying an appropriate aftercare plan     Progress Towards Goals and Assessment of Patient:   Patient is making progress towards treatment goals as evidenced by looking for ways to find help.       Therapeutic Intervention(s):   Provided active listening, unconditional positive regard, and validation.   Engaged in guided discovery, explored patient's perspectives and helped expand them through socratic dialogue      Plan/next step: Engaged in unit programming  Check in with therapist as needed      No Charge (0-15 min)    Patient Active Problem List   Diagnosis    Bipolar disorder (H)    Cannabis dependence (H)    Major depressive disorder, recurrent severe without psychotic features (H)

## 2024-06-12 NOTE — DISCHARGE INSTRUCTIONS
Behavioral Discharge Planning and Instructions    Summary: You were admitted to [facility] on 6/6/2024 due to Suicidal Ideations. You were treated by DAVID Cartagena CNP and discharged on 06/13/2024 to Home.    Main Diagnosis:   Unspecified Bipolar and Related Disorder - r/o Substance-Induced Bipolar and Related Disorder vs Bipolar Disorder vs Personality Disorder     Health Care Follow-up:   Appointment Date/Time: Wednesday July 10th at 2pm (in person)   Psychiatrist/Medication Management: Staci Srinivasan at [facility]  Address: [address], [place] [address]   Phone Number:  [phone]     AND talk with them about getting a therapist at this location  For emergency access to psychiatry you can visit the Acute Psych Services at [facility]  Enter through the Emergency Department Door, continue past ED, Acute Psych Services is on the right  They can help if you are struggling to manage your mental health   They can assist with medication management in emergencies       Attend all scheduled appointments with your outpatient providers. Call at least 24 hours in advance if you need to reschedule an appointment to ensure continued access to your outpatient providers.     Major Treatments, Procedures and Findings:  You were provided with: a psychiatric assessment, assessed for medical stability, medication evaluation and/or management, group therapy, and milieu management    Symptoms to Report: Feeling more aggressive, increased confusion, losing more sleep, mood getting worse, or thoughts of suicide.    Early warning signs can include: Increased depression or anxiety sleep disturbances increased thoughts or behaviors of suicide or self-harm  increased unusual thinking, such as paranoia or hearing voices.    Safety and Wellness: Take all medicines as directed. Make no changes unless your doctor suggests them. Follow treatment recommendations. Refrain from alcohol and  "non-prescribed drugs. Ask your support system to help you reduce your access to items that could harm yourself or others. If there is a concern for safety, call [phone].    Resources:   General Mental Health Resources:   National [place] on Mental Illness (LUKASZ) [place]: Connect for help, to navigate the mental health system, and for support and for resources. Call: 4-903-UPBJ-Helps / 7-863-111-9536  Crisis Text Line: The 24/7 emergency service is available if you or someone you know is experiencing a psychiatric or mental health crisis. Text: \"[place]\" to 978250  [place] Warmline: Are you an adult needing support? Talk to a specialist who has firsthand experience living with a mental health condition. Call: 166.631.6827  Text: \"Support\" to 75007  [facility].org/support/[place]-warmline/  National Suicide Prevention Lifeline: The 24/7 lifeline provides support when in distress, has prevention and crisis resources for you or your loved ones, and resources for professionals. Call 0-460-488-ZAWN (2120)  Peer Support Connection Warmlines: Qmks-ue-wfiu telephone support that s safe and supportive. Open 5 p.m. to 9 a.m. Call or text: 1-732.593.7082   COVID Cares Stress Phone Support Service. Any [place] experiencing stress can call 887-JTWN6LY (152-128-0930) for free telephone support from 9am to 9pm every day. The service is a collaboration with volunteers from the [place] Psychiatric Society, the [place] Psychological Association, the [place] Black Psychologists, and [facility] [place]. The free service is also accessible at 3dplusme.Firefly Media where searchers can also find psychiatric and mental health services availability and real-time Substance Use Disorder Treatment program openings.  Adult Rehabilitative Mental Health Services (ARMHS): https://[place].gov/dhs/partners-and-providers/policies-procedures/adult-mental-health/adult-rehabilitative-mental-health-services/armhs-certified-providers/  Self- " Management and Recovery Training., Sessions-- Toll free: 364.156.5303  www.Comeks  [facility] Crisis Response 624-243-7406  [facility] Crisis Response - Adult [phone]  [facility] Rapid Response [phone]    Outpatient Psychiatry/Therapy Resources:   [facility] - (phone: 808.343.3146) https://www.DB3 Mobile/care/specialty/mental-behavioral-health/  https://www.DB3 Mobile/care/find/doctors/psychologists/  [facility] - (phone: 8-269-XNMXMTLI) https://www.[employer] [facility].org/treatments/Counseling-Adult  [facility] - (phone: 387.359.7086) https://Applied Superconductor/  [facility] - (phone: 563.505.2458)  https://[facility]/  [facility] - (phone: 1-866.974.2423) https://www.Next Gen Illumination/  [facility] - (phone: 510.712.5724) https://www.Bawteetherapy.Citizinvestor/  [facility] - (phone: 326.180.7847) https://Tipstar/  [facility] - (phone: 146.968.1719) https://walkin.org/    General Medication Instructions:   See your medication sheet(s) for instructions.   Take all medicines as directed.  Make no changes unless your doctor suggests them.   Go to all your doctor visits.  Be sure to have all your required lab tests. This way, your medicines can be refilled on time.  Do not use any drugs not prescribed by your doctor.  Avoid alcohol.    Advance Directives:   Scanned document on file with [employer]? No scanned doc  Is document scanned? No. Copy Requested.  Honoring Choices Your Rights Handout: Informed and given  Was more information offered? Pt declined    The Treatment team has appreciated the opportunity to work with you. If you have any questions or concerns about your recent admission, you can contact the unit which can receive your call 24 hours a day, 7 days a week. They will be able to get in touch with a  Provider if needed. The unit number is [phone].

## 2024-06-12 NOTE — PLAN OF CARE
BEH IP Unit Acuity Rating Score (UARS)  Patient is given one point for every criteria they meet.    CRITERIA SCORING   On a 72 hour hold, court hold, committed, stay of commitment, or revocation. 0    Patient LOS on BEH unit exceeds 20 days. 0  LOS: 4   Patient under guardianship, 55+, otherwise medically complex, or under age 11. 0   Suicide ideation without relief of precipitating factors. 1   Current plan for suicide. 0   Current plan for homicide. 0   Imminent risk or actual attempt to seriously harm another without relief of factors precipitating the attempt. 0   Severe dysfunction in daily living (ex: complete neglect for self care, extreme disruption in vegetative function, extreme deterioration in social interactions). 1   Recent (last 7 days) or current physical aggression in the ED or on unit. 0   Restraints or seclusion episode in past 72 hours. 0   Recent (last 7 days) or current verbal aggression, agitation, yelling, etc., while in the ED or unit. 0   Active psychosis. 0   Need for constant or near constant redirection (from leaving, from others, etc).  0   Intrusive or disruptive behaviors. 0   Patient requires 3 or more hours of individualized nursing care per 8-hour shift (i.e. for ADLs, meds, therapeutic interventions). 0   TOTAL 2

## 2024-06-12 NOTE — PROGRESS NOTES
"PSYCHIATRY  PROGRESS NOTE     DATE OF SERVICE   06/12/24          CHIEF COMPLAINT   \" Pretty good.\"       SUBJECTIVE   Per nursing documentation:    Patient presents with a blunted flat affect, his mood is calm. He is visible in the milieu, social and interactive with select peers. He paced in the duval,spent time in the dining playing cards with another peer and listened to Music on his head phones. He denies SI/SIB/HI and AVH, denies feeling anxious or depressed. He is eating and drinking adequately, went to lay down in his room after dinner. He denies having any physical pain or discomfort, no bowel and bladder issues. He is medication complaint, to continue with POC.     Pt in bed at beginning of shift, breathing quiet and unlabored. Pt slept through the shift for 7 hours. No pt complaints or concerns at this time. No PRNs given. Remains on every 15 minutes safety checks. Will continue to monitor.     Per unit [facility] documentation:      -Attending Provider: DAVID Cartagena CNP  -Voicemail Code: [id number]#  -Team Note Due: Tuesday  -Next Steps:     Update discharge instructions to include 2 clinics near him that provide therapy.              Assessment/Intervention/Current Symtoms and Care Coordination:  Chart review and met with team, discussed pt progress, symptomology, and response to treatment.  Discussed the discharge plan and any potential impediments to discharge.  [facility] met with Chance to call [facility] so Chance could pay his past due amount and schedule a psych appointment. [facility] obtained Chance's debit card from his folder that had an envelope with his debit card in it. Chance was able to pay his outstanding balance and schedule an inperson psych appointment for July 10th at 2pm. [facility] discussed case management referral with Chance. Chance stated he would like to think about whether or not he wanted to work with a . [facility] returned debit card to envelope that is in paper chart.      Discharge Plan or Goal:  Pending " "further stabilization, continued compliance with medications, continued activities of daily living, and development of a safe discharge plan.   Considerations:      Barriers to Discharge:  Impact of mental health symptoms on well being   Impact of mental health symptoms on activities of daily living  Need for medication monitoring and medication management     Referral Status:  TBD     Legal Status:  Voluntary   County:   File Number:   Start and expiration date of commitment:      Contacts:        Upcoming Meetings and Dates/Important Information:        -Still Needed on AVS:         OBJECTIVE   Met with patient in interview room of unit [facility] [address]. Upon patient interview, patient reports their mood as, \" pretty good.\"  Patient's affect appears full range.  Patient's thought process is slightly circumstantial however more organized and goal oriented today.  Patient reports that he feels his mood is stabilizing and he feels more calm since initiation of Depakote as well as quetiapine.  Patient also reports he has been experiencing, \"Authentic laughs and authentic smiles for the first time in a long time.\"  Patient denies any depression symptoms.  Patient denies any anxiety symptoms. Patient denies any thoughts of SI, SIB, SA, intent or plan. Denies HI. Patient able to contract for safety. Patient denies any AH or VH.  Patient denies any paranoid thoughts or delusions. Patient endorses sleeping well overnight since initiation of medication quetiapine.  Patient denies any side effects of current medication regimen.  Patient reports appetite is stable and they are eating well and as well as drinking fluids adequately.  Denies any issue with bowel or bladder.  Patient vital signs reviewed and patient noted to be experiencing some ongoing hypertension and elevated pulse however this is improving today.  Plan will be to continue to monitor vital signs closely for now.  Patient previously reported medical concern of a persistent " wart on his left thumb which has not responded to OTC treatments.  Internal medicine provider recommended starting salicylic acid 17% topical gel to treat wart and patient in agreement to trial this.  Today, patient reports salicylic acid has been effective treatment for the wart on his left thumb.  Plan will be to continue to monitor this closely.  Patient denies any medical concerns today. Patient has no other questions or concerns. Today, plan will be to continue current medication regimen with no changes.  Provider discussed the plan will be to recheck VPA level, CMP, and CBC on the morning of 6/13/2024.  Patient in agreement with this.  Patient expresses that he hopes to discharge once lab results are back.  Provider discussed with patient that VPA levels have typically taken longer to result in sometimes are not back until the evening.  Patient verbalizes understanding and is in agreement to wait until VPA level is resulted in order to discharge.  Patient also reports that he was able to make an appointment with his outpatient psychiatry provider for July 3, 2024 and would like to be prescribed enough medications to get him to this outpatient appointment.  Patient offers no other questions or concerns.       MEDICATIONS   Medications:  Scheduled Meds:  Current Facility-Administered Medications   Medication Dose Route Frequency Provider Last Rate Last Admin    divalproex sodium extended-release (DEPAKOTE ER) 24 hr tablet 750 mg  750 mg Oral At Bedtime Nafisa Akins APRN CNP   750 mg at 06/11/24 2202    QUEtiapine (SEROquel XR) 24 hr tablet 100 mg  100 mg Oral At Bedtime Zee Roque APRN CNP   100 mg at 06/11/24 2201    salicylic acid (COMPOUND W MAX STRENGTH) 17 % topical gel   Topical Daily Nedra Ludwig APRN CNP   Given at 06/12/24 1021     Continuous Infusions:  Current Facility-Administered Medications   Medication Dose Route Frequency Provider Last Rate Last Admin     PRN  "Meds:.  Current Facility-Administered Medications   Medication Dose Route Frequency Provider Last Rate Last Admin    acetaminophen (TYLENOL) tablet 650 mg  650 mg Oral Q4H PRN Jensen, Gianni, DO        alum & mag hydroxide-simethicone (MAALOX) suspension 30 mL  30 mL Oral Q4H PRN Jensen, Gianni, DO        hydrOXYzine HCl (ATARAX) tablet 25 mg  25 mg Oral Q4H PRN Jensen, Gianni, DO        loperamide (IMODIUM) capsule 2 mg  2 mg Oral 4x Daily PRN Jensen, Gianni, DO        melatonin tablet 5 mg  5 mg Oral At Bedtime PRN Jensen, Gianni, DO        OLANZapine (zyPREXA) tablet 10 mg  10 mg Oral TID PRN Gianni Jensen, DO        Or    OLANZapine (zyPREXA) injection 10 mg  10 mg Intramuscular TID PRN Jensen, Gianni, DO        senna-docusate (SENOKOT-S/PERICOLACE) 8.6-50 MG per tablet 1 tablet  1 tablet Oral BID PRN Mouna eJnsenoa, DO        traZODone (DESYREL) tablet 50 mg  50 mg Oral At Bedtime PRN Camila Gianni, DO           Medication adherence issues: MS Med Adherence Y/N: No  Medication side effects: MEDICATION SIDE EFFECTS: no side effects reported  Benefit: Yes / No: Yes       ROS   Pertinent items are noted in HPI.       MENTAL STATUS EXAM   Vitals: BP (!) 125/92 (BP Location: Right arm)   Pulse 94   Temp 98.3  F (36.8  C) (Oral)   Resp 16   Wt 103.5 kg (228 lb 1.6 oz)   SpO2 97%   BMI 33.68 kg/m      Appearance:  Casually groomed  Mood: Reports, \"pretty good.\"    Affect: full range was congruent to speech  Suicidal Ideation: PRESENT / ABSENT: absent   Homicidal Ideation: PRESENT / ABSENT: absent   Thought process: Slightly circumstantial however improving, more organized and goal oriented  Thought content: denies suicidal and violent ideation.   Fund of Knowledge: Average  Attention/Concentration: Fair  Language ability:  Intact  Memory: Appears intact, not formally assessed  Insight: Limited  Judgement: Limited  Orientation: Yes, x4  Psychomotor Behavior: normal or unremarkable    Muscle Strength and Tone: MuscleStrength: " Normal  Gait and Station: Normal       LABS   personally reviewed.      Latest Reference Range & Units 06/06/24 07:54 06/08/24 12:40 06/08/24 13:29 06/09/24 08:27   Sodium 135 - 145 mmol/L   140    Potassium 3.4 - 5.3 mmol/L   4.1    Chloride 98 - 107 mmol/L   101    Carbon Dioxide (CO2) 22 - 29 mmol/L   24    Urea Nitrogen 6.0 - 20.0 mg/dL   12.4    Creatinine 0.67 - 1.17 mg/dL   0.82    GFR Estimate >60 mL/min/1.73m2   >90    Calcium 8.6 - 10.0 mg/dL   9.7    Anion Gap 7 - 15 mmol/L   15    Albumin 3.5 - 5.2 g/dL   4.8    Protein Total 6.4 - 8.3 g/dL   7.3    Alkaline Phosphatase 40 - 150 U/L   95    ALT 0 - 70 U/L   110 (H)    AST 0 - 45 U/L   48 (H)    Bilirubin Total <=1.2 mg/dL   0.2    Cholesterol <200 mg/dL    173   Creatinine Urine mg/dL 194      Folate 4.6 - 34.8 ng/mL   9.4    Glucose 70 - 99 mg/dL   87    HDL Cholesterol >=40 mg/dL    58   Hemoglobin A1C <5.7 %   5.5    LDL Cholesterol Calculated <=100 mg/dL    78   Non HDL Cholesterol <130 mg/dL    115   Triglycerides <150 mg/dL    187 (H)   TSH 0.30 - 4.20 uIU/mL   1.01    Vitamin B12 232 - 1,245 pg/mL   1,103    Vitamin D, Total (25-Hydroxy) 20 - 50 ng/mL   31    WBC 4.0 - 11.0 10e3/uL   12.5 (H)    Hemoglobin 13.3 - 17.7 g/dL   16.6    Hematocrit 40.0 - 53.0 %   49.3    Platelet Count 150 - 450 10e3/uL   349    RBC Count 4.40 - 5.90 10e6/uL   5.37    MCV 78 - 100 fL   92    MCH 26.5 - 33.0 pg   30.9    MCHC 31.5 - 36.5 g/dL   33.7    RDW 10.0 - 15.0 %   12.8    % Neutrophils %   63    % Lymphocytes %   25    % Monocytes %   7    % Eosinophils %   3    % Basophils %   1    Absolute Basophils 0.0 - 0.2 10e3/uL   0.1    Absolute Eosinophils 0.0 - 0.7 10e3/uL   0.3    Absolute Immature Granulocytes <=0.4 10e3/uL   0.1    Absolute Lymphocytes 0.8 - 5.3 10e3/uL   3.2    Absolute Monocytes 0.0 - 1.3 10e3/uL   0.8    % Immature Granulocytes %   1    Absolute Neutrophils 1.6 - 8.3 10e3/uL   8.0    Absolute NRBCs 10e3/uL   0.0    NRBCs per 100 WBC <1 /100   0   "  Amphetamine Qual Urine Screen Negative  Screen Negative      Fentanyl Qual Urine Screen Negative  Screen Negative      Cocaine Urine Screen Negative  Screen Negative      Benzodiazepine Urine Screen Negative  Screen Negative      Opiates Qualitative Urine Screen Negative  Screen Negative      PCP Urine Screen Negative  Screen Negative      Cannabinoids Qual Urine Screen Negative  Screen Positive !      Barbiturates Qual Urine Screen Negative  Screen Negative      EKG 12-LEAD, TRACING ONLY   Rpt (C)     (H): Data is abnormally high  !: Data is abnormal  (C): Corrected  Rpt: View report in Results Review for more information    No results found for: \"PHENYTOIN\", \"PHENOBARB\", \"VALPROATE\", \"CBMZ\"       DIAGNOSIS   Principal Problem:    Unspecified Bipolar and Related Disorder - r/o Substance-Induced Bipolar and Related Disorder vs Bipolar Disorder vs Personality Disorder  Secondary Diagnoses:   Polysubstance use disorder (cannabis, opiates)  Rule out cluster B traits    Clinically Significant Risk Factors                                       # Financial/Environmental Concerns: unable to afford medication(s)                Active Problem List:  Patient Active Problem List   Diagnosis    Bipolar disorder (H)    Cannabis dependence (H)    Major depressive disorder, recurrent severe without psychotic features (H)          HOSPITAL COURSE AND ASSESSMENT   Estevan Harris is a 28 year old male with history of  bipolar disorder, major depressive disorder, anxiety disorder, PTSD, ADHD, and polysubstance use. Patient was brought in by his roommate to [facility] ED on 6/6/2024 for reports of manic behaviors and memory loss.  On Monday, patient reportedly woke up in a pile of glass.  He does not remember the events that preceded this incident.  Patient states he \"blacks out\" when he has a manic episode.  He reports recent stressors including termination of a relationship, in which ex-partner is now attempting to gain custody of his " emotional support dog.  Patient reports this situation with his ex-partner triggered his PTSD from having his daughter taken away from him 8 years ago.  Patient reports he came to the hospital for management of his bipolar disorder, as he has not taken medications for at least 3 years.  Patient reportedly has been unable to pay for his medications, as he has not previously had health insurance.  However, patient reports he will soon be signing up for medical insurance through his work.  Patient reports he has previously taken lithium, risperidone, Seroquel, and Depakote.  While in ED, Seroquel XR 100mg was initiated for sleep and psychosis, and Depakote DR 250mg BID was initiated for mood stabilization. UDS positive for cannabinoids. Patient declined CD assessment.     At time of admission to inpatient unit 10N patient agreeable to continuing quetiapine and Depakote to target mood stabilization.       PLAN   1. Ongoing education given regarding diagnostic and treatment options with risks, benefits and alternatives and adequate verbalization of understanding.  2.  Medications:  -Continue Depakote  mg PO capsule at bedtime  -Continue quetiapine 24-hour tablet 100 mg PO capsule at bedtime to target mood stabilization and promote sleep  -PRN hydroxyzine tablet 25 mg PO every 4 hours as needed for anxiety  -PRN olanzapine tablet 10 mg PO 3 times daily as needed for agitation, ordered linked with backup olanzapine 10 mg IM injection    3.  Labs/Imaging:  -Ordered CBC, CMP, and VPA level for 6/13/2024 at 0800     4. Consults:  -Consult hospitalist as needed    5. Precautions:  -Suicide precautions    6. Legal:  -Voluntary    7. Discharge planning:  -Per unit CTC        Risk Assessment: IP MHAC RISK ASSESSMENT: Patient able to contract for safety and Patient on precautions    Coordination of Care:   Treatment Plan reviewed and physician signed, Care discussed with Care/Treatment Team Members, Chart reviewed and  Patient seen      Re-Certification I certify that the inpatient psychiatric facility services furnished since the previous certification were, and continue to be, medically necessary for, either, treatment which could reasonably be expected to improve the patient s condition or diagnostic study and that the hospital records indicate that the services furnished were, either, intensive treatment services, admission and related services necessary for diagnostic study, or equivalent services.     I certify that the patient continues to need, on a daily basis, active treatment furnished directly by or requiring the supervision of inpatient psychiatric facility personnel.   I estimate 7 days of hospitalization is necessary for proper treatment of the patient. My plans for post-hospital care for this patient are   TBD     DAVID Pierce CNP    -     06/12/24       -     1:10 PM    Total time  35 minutes with > 50%spent on coordination of cares and psycho-education.    This note was created with help of Dragon dictation system. Grammatical / typing errors are not intentional.    DAVID Pierce CNP

## 2024-06-12 NOTE — PROGRESS NOTES
Patient Belongings   Did you bring any home meds/supplements to the hospital?  No   Disposition of meds  N/A   Patient Belongings Remaining with Patient Unit-safe clothing   Patient Belongings Put in Hospital Secure Location (Security or Locker, etc.) Jeans, belt, wallet, credit cards, social security card, cell phone, $2, Via #[id number], Money Card #[id number], papers   Belongings Search Yes      Locker: jeans, belt, wallet with social security card, cell phone, Nike slip-on shoes, papers    Security: $2 bill, Visa #[id number], Money card #[id number]             A               Admission:  I am responsible for any personal items that are not sent to the safe or pharmacy.  [facility] is not responsible for loss, theft or damage of any property in my possession.    Signature:  _________________________________ Date: _______  Time: _____                                              Staff Signature:  ____________________________ Date: ________  Time: _____      2nd Staff person, if patient is unable/unwilling to sign:    Signature: ________________________________ Date: ________  Time: _____     Discharge:  [facility] has returned all of my personal belongings:    Signature: _________________________________ Date: ________  Time: _____                                          Staff Signature:  ____________________________ Date: ________  Time: _____

## 2024-06-12 NOTE — PLAN OF CARE
Alert Team Note:    Faxed updated MAR to [place] as requested by Zoë.    Goal Outcome Evaluation:    Plan of Care Reviewed With: patient          Patient presents with a blunted flat affect, his mood is calm. He is visible in the milieu, social and interactive with select peers. He paced in the duval,spent time in the dining playing cards with another peer and listened to Music on his head phones. He denies SI/SIB/HI and AVH, denies feeling anxious or depressed. He is eating and drinking adequately, went to lay down in his room after dinner. He denies having any physical pain or discomfort, no bowel and bladder issues. He is medication complaint, to continue with POC.

## 2024-06-13 VITALS
SYSTOLIC BLOOD PRESSURE: 142 MMHG | OXYGEN SATURATION: 100 % | WEIGHT: 228.1 LBS | BODY MASS INDEX: 33.68 KG/M2 | RESPIRATION RATE: 16 BRPM | HEART RATE: 62 BPM | TEMPERATURE: 97.7 F | DIASTOLIC BLOOD PRESSURE: 99 MMHG

## 2024-06-13 LAB
ALBUMIN SERPL BCG-MCNC: 4.3 G/DL (ref 3.5–5.2)
ALP SERPL-CCNC: 79 U/L (ref 40–150)
ALT SERPL W P-5'-P-CCNC: 94 U/L (ref 0–70)
ANION GAP SERPL CALCULATED.3IONS-SCNC: 11 MMOL/L (ref 7–15)
AST SERPL W P-5'-P-CCNC: 33 U/L (ref 0–45)
BASOPHILS # BLD AUTO: 0.1 10E3/UL (ref 0–0.2)
BASOPHILS NFR BLD AUTO: 1 %
BILIRUB SERPL-MCNC: 0.2 MG/DL
BUN SERPL-MCNC: 16.8 MG/DL (ref 6–20)
CALCIUM SERPL-MCNC: 9.5 MG/DL (ref 8.6–10)
CHLORIDE SERPL-SCNC: 107 MMOL/L (ref 98–107)
CREAT SERPL-MCNC: 0.8 MG/DL (ref 0.67–1.17)
DEPRECATED HCO3 PLAS-SCNC: 28 MMOL/L (ref 22–29)
EGFRCR SERPLBLD CKD-EPI 2021: >90 ML/MIN/1.73M2
EOSINOPHIL # BLD AUTO: 0.3 10E3/UL (ref 0–0.7)
EOSINOPHIL NFR BLD AUTO: 4 %
ERYTHROCYTE [DISTWIDTH] IN BLOOD BY AUTOMATED COUNT: 12.6 % (ref 10–15)
GLUCOSE SERPL-MCNC: 97 MG/DL (ref 70–99)
HCT VFR BLD AUTO: 45.8 % (ref 40–53)
HGB BLD-MCNC: 15 G/DL (ref 13.3–17.7)
IMM GRANULOCYTES # BLD: 0 10E3/UL
IMM GRANULOCYTES NFR BLD: 0 %
LYMPHOCYTES # BLD AUTO: 2.9 10E3/UL (ref 0.8–5.3)
LYMPHOCYTES NFR BLD AUTO: 39 %
MCH RBC QN AUTO: 30.5 PG (ref 26.5–33)
MCHC RBC AUTO-ENTMCNC: 32.8 G/DL (ref 31.5–36.5)
MCV RBC AUTO: 93 FL (ref 78–100)
MONOCYTES # BLD AUTO: 0.7 10E3/UL (ref 0–1.3)
MONOCYTES NFR BLD AUTO: 10 %
NEUTROPHILS # BLD AUTO: 3.4 10E3/UL (ref 1.6–8.3)
NEUTROPHILS NFR BLD AUTO: 46 %
NRBC # BLD AUTO: 0 10E3/UL
NRBC BLD AUTO-RTO: 0 /100
PLATELET # BLD AUTO: 297 10E3/UL (ref 150–450)
POTASSIUM SERPL-SCNC: 4.5 MMOL/L (ref 3.4–5.3)
PROT SERPL-MCNC: 6.6 G/DL (ref 6.4–8.3)
RBC # BLD AUTO: 4.92 10E6/UL (ref 4.4–5.9)
SODIUM SERPL-SCNC: 146 MMOL/L (ref 135–145)
VALPROATE SERPL-MCNC: 36.7 UG/ML
WBC # BLD AUTO: 7.3 10E3/UL (ref 4–11)

## 2024-06-13 PROCEDURE — 99239 HOSP IP/OBS DSCHRG MGMT >30: CPT

## 2024-06-13 PROCEDURE — 250N000013 HC RX MED GY IP 250 OP 250 PS 637: Performed by: PSYCHIATRY & NEUROLOGY

## 2024-06-13 PROCEDURE — 80164 ASSAY DIPROPYLACETIC ACD TOT: CPT

## 2024-06-13 PROCEDURE — G0177 OPPS/PHP; TRAIN & EDUC SERV: HCPCS

## 2024-06-13 PROCEDURE — 85004 AUTOMATED DIFF WBC COUNT: CPT

## 2024-06-13 PROCEDURE — 82040 ASSAY OF SERUM ALBUMIN: CPT

## 2024-06-13 PROCEDURE — 36415 COLL VENOUS BLD VENIPUNCTURE: CPT

## 2024-06-13 RX ORDER — QUETIAPINE FUMARATE 50 MG/1
100 TABLET, EXTENDED RELEASE ORAL AT BEDTIME
Qty: 60 TABLET | Refills: 0 | Status: SHIPPED | OUTPATIENT
Start: 2024-06-13

## 2024-06-13 RX ORDER — DIVALPROEX SODIUM 250 MG/1
750 TABLET, EXTENDED RELEASE ORAL AT BEDTIME
Qty: 90 TABLET | Refills: 0 | Status: SHIPPED | OUTPATIENT
Start: 2024-06-13

## 2024-06-13 RX ORDER — QUETIAPINE FUMARATE 25 MG/1
25 TABLET, FILM COATED ORAL 2 TIMES DAILY PRN
Qty: 60 TABLET | Refills: 0 | Status: SHIPPED | OUTPATIENT
Start: 2024-06-13

## 2024-06-13 RX ADMIN — OLANZAPINE 10 MG: 10 TABLET, FILM COATED ORAL at 10:34

## 2024-06-13 RX ADMIN — Medication: at 10:34

## 2024-06-13 ASSESSMENT — ACTIVITIES OF DAILY LIVING (ADL)
ADLS_ACUITY_SCORE: 28
DRESS: INDEPENDENT
ADLS_ACUITY_SCORE: 28
ORAL_HYGIENE: INDEPENDENT
ADLS_ACUITY_SCORE: 28
HYGIENE/GROOMING: INDEPENDENT
ADLS_ACUITY_SCORE: 28
LAUNDRY: WITH SUPERVISION
ADLS_ACUITY_SCORE: 28

## 2024-06-13 NOTE — PLAN OF CARE
Goal Outcome Evaluation:    Plan of Care Reviewed With: patient        Patient presents as alert and oriented, he is visible in the milieu, pacing in the duval way with his head phones on. He is social and interactive with other peers. He described his mood as good, has a full range affect, denies feeling anxious or depressed. Patient told writer he is anticipating to discharge tomorrow and he is excited about that. He denies having any pain or discomfort, denies SI/SIB/HI and AVH. He is eating and drinking adequately, denies having bowel and bladder issues. No PRN's this shift, he is medication compliant, to continue with POC

## 2024-06-13 NOTE — PROGRESS NOTES
"  Rehab Group    Start time: 11:15  End time: 12:10  Patient time total: 55 minutes    attended full group    #5 attended   Group Type: general health and coping   Group Topic Covered: self-esteem     Group Session Detail:  OT facilitated a dice game to practice self-esteem exercises. Various questions were posed to patients to promote self confidence as a coping tool for overall wellbeing. Questions included topics such as gratitude, defining personal happiness, the importance of vulnerability, and naming positive characteristics about the self.      Patient Response/Contribution:  cooperative with task and actively engaged     Patient Detail:    Pt presented with an improved affect/mood during this group. Shared that he doesn't feel he struggles with self-confidence, at times has felt the opposite like he may be too arrogant. However, engaged well in activity questions. Prefers to keep answers more social/superficial but appropriately answered a wide variety of self esteem questions. Shared he feels most like himself \"in a mos pit.\"         Train & Education Service Per Session 45 + Minutes () OT Group code    Patient Active Problem List   Diagnosis    Bipolar disorder (H)    Cannabis dependence (H)    Major depressive disorder, recurrent severe without psychotic features (H)       "

## 2024-06-13 NOTE — PLAN OF CARE
BEH IP Unit Acuity Rating Score (UARS)  Patient is given one point for every criteria they meet.    CRITERIA SCORING   On a 72 hour hold, court hold, committed, stay of commitment, or revocation. 0    Patient LOS on BEH unit exceeds 20 days. 0  LOS: 5   Patient under guardianship, 55+, otherwise medically complex, or under age 11. 0   Suicide ideation without relief of precipitating factors. 1   Current plan for suicide. 0   Current plan for homicide. 0   Imminent risk or actual attempt to seriously harm another without relief of factors precipitating the attempt. 0   Severe dysfunction in daily living (ex: complete neglect for self care, extreme disruption in vegetative function, extreme deterioration in social interactions). 1   Recent (last 7 days) or current physical aggression in the ED or on unit. 0   Restraints or seclusion episode in past 72 hours. 0   Recent (last 7 days) or current verbal aggression, agitation, yelling, etc., while in the ED or unit. 0   Active psychosis. 0   Need for constant or near constant redirection (from leaving, from others, etc).  0   Intrusive or disruptive behaviors. 0   Patient requires 3 or more hours of individualized nursing care per 8-hour shift (i.e. for ADLs, meds, therapeutic interventions). 0   TOTAL 2

## 2024-06-13 NOTE — PLAN OF CARE
Goal Outcome Evaluation:    Plan of Care Reviewed With: patient        Patient was visible in the milieu, he is social and interactive with peers. Patient presents with a blunted, flat affect, his mood was very anxious because of anticipated discharge, declined taking any PRN's. Patient's Depakote levels in, discharge order placed in, patient ready to discharge home. Patient denies SI/SIB/HI and AVH.He denies having any pain or discomfort. Patient's discharge instructions handed to patient. Patient's appetite is adequate, had his dinner before leaving the unit. Patient discharged home with his belongings and Medications at 6.25 pm to his home. Patient picked up by his friend by the [facility] entrance.

## 2024-06-13 NOTE — PROGRESS NOTES
Rehab Group    Start time: 10:15  End time: 11:15  Patient time total: 45 minutes    came in and out of group session    #5 attended   Group Type: OT Clinic   Group Topic Covered: balanced lifestyle, coping skills, healthy leisure time, and social skills     Group Session Detail:  Pt actively participated in occupational therapy clinic to facilitate coping skill exploration, creative expression within personally meaningful activities, and clinical observation of social, cognitive, and kinesthetic performance skills.      Patient Response/Contribution:  Appeared upset/anxious, cooperative   Patient Detail:    Pt joined group and shared that he just found out his dog swallowed airpods and required an emergency vet visit, has lost 10 lbs. Pt appeared anxious, frustrated. Shared he is upset that he left his dog with his roommate. Pt has previously expressed frustrations about his living situation. Pt declined distracting activities and left group to walk the halls, however did return about 15 minutes later to socialize with peers/staff in group. Affect brightened when distracted socially. Pt reported feeling anxious to get home this afternoon.           Train & Education Service Per Session 45 + Minutes () OT Group code    Patient Active Problem List   Diagnosis    Bipolar disorder (H)    Cannabis dependence (H)    Major depressive disorder, recurrent severe without psychotic features (H)

## 2024-06-13 NOTE — PLAN OF CARE
Problem: Suicide Risk  Goal: Absence of Self-Harm  Outcome: Progressing     Problem: Adult Behavioral Health Plan of Care  Goal: Adheres to Safety Considerations for Self and Others  Outcome: Progressing  Intervention: Develop and Maintain Individualized Safety Plan  Recent Flowsheet Documentation  Taken 6/13/2024 0324 by Liam Acosta RN  Safety Measures: safety rounds completed   Goal Outcome Evaluation:       Patient was calm and slept most part of the shift. No safety concern was noted or reported. Patient's breathing was normal and not labored during safety checks. No pain was reported and no medication was given.      Sleep Hours: 6.75

## 2024-06-13 NOTE — PLAN OF CARE
-Attending Provider: DAVID Cartagena CNP  -Voicemail Code: [phone]  -Team Note Due: Tuesday  -Next Steps:    Patient can discharge when labs come back (6/13 likely after 3pm)   Discharge Instructions from [facility] are complete  [facility] will need to order a cab if Chance's friend is not able to pick him up)        Assessment/Intervention/Current Symtoms and Care Coordination:  Chart review and met with team, discussed pt progress, symptomology, and response to treatment.  Discussed the discharge plan and any potential impediments to discharge.  [facility] updated AVS to include walk in information for psych services.     Discharge Plan or Goal:  Pending further stabilization, continued compliance with medications, continued activities of daily living, and development of a safe discharge plan.   Considerations:     Barriers to Discharge:  Impact of mental health symptoms on well being   Impact of mental health symptoms on activities of daily living  Need for medication monitoring and medication management     Referral Status:  TBD     Legal Status:  Voluntary   County:   File Number:   Start and expiration date of commitment:     Contacts:        Upcoming Meetings and Dates/Important Information:      -Still Needed on AVS:

## 2024-06-13 NOTE — PLAN OF CARE
RN Assessment    SI: denies  SIB: denies  HI: denies  AVH: denies  Thought process: WDL  Sleep: WDL  Affect & Mood: labile-- pt did get quite upset after finding out today that his emotional support dog is very sick with a GI infection and has lost 10 lbs since he has been in the hospital-- pt requested PRN zyprexa which was helpful  Behavior: Pt is compliant with medications. Pt attended OT groups. Pt is pleasant and social with peers and staff. Plan is for pt to discharge this evening-- just waiting for Valproic Acid lab to result prior to discharging. Pt will be picked up by a friend    Possible Medication SE: none reported or observed  Hygiene: adequate   VS: elevated BP, asymptomatic  Pain: denies  Medical Concerns: pt has a wart on one of his fingers which has been being treated and pt states has very much improved at this point  Appetite & Fluid intake: adequate   Bowel & Bladder: no issues reported    PRN medications utilized this shift include:  Zyprexa 10 mg--agitation-- helpful

## 2024-06-13 NOTE — DISCHARGE SUMMARY
"PSYCHIATRY  DISCHARGE SUMMARY     DATE OF DISCHARGE   06/13/2024           DISCHARGE DIAGNOSIS   Bipolar disorder, type I, moderate, current episode manic  Polysubstance use disorder (cannabis, opiates)  PTSD  Rule out cluster B traits      Clinically Significant Risk Factors         # Hypernatremia: Highest Na = 146 mmol/L in last 2 days, will monitor as appropriate                                 # Financial/Environmental Concerns: unable to afford medication(s)               Patient Active Problem List   Diagnosis    Bipolar disorder (H)    Cannabis dependence (H)    Major depressive disorder, recurrent severe without psychotic features (H)          REASON FOR ADMISSION   Estevan Harris is a 28 year old male with history of  bipolar disorder, major depressive disorder, anxiety disorder, PTSD, ADHD, and polysubstance use. Patient was brought in by his roommate to [facility] ED on 6/6/2024 for reports of manic behaviors and memory loss.  On Monday, patient reportedly woke up in a pile of glass.  He does not remember the events that preceded this incident.  Patient states he \"blacks out\" when he has a manic episode.  He reports recent stressors including termination of a relationship, in which ex-partner is now attempting to gain custody of his emotional support dog.  Patient reports this situation with his ex-partner triggered his PTSD from having his daughter taken away from him 8 years ago.  Patient reports he came to the hospital for management of his bipolar disorder, as he has not taken medications for at least 3 years.  Patient reportedly has been unable to pay for his medications, as he has not previously had health insurance.  However, patient reports he will soon be signing up for medical insurance through his work.  Patient reports he has previously taken lithium, risperidone, Seroquel, and Depakote.  While in ED, Seroquel XR 100mg was initiated for sleep and psychosis, and Depakote DR 250mg BID was initiated " "for mood stabilization. UDS positive for cannabinoids. Patient declined CD assessment.  Patient admitted voluntarily to inpatient psychiatric unit 10 N for psychiatric stabilization with attending Dr. Palomares.          HOSPITAL COURSE   Admitted due to aforementioned presentation.  Education regarding diagnostic and treatment options with risks, benefits and alternatives and adequate verbalization of understanding.  Discussed reviewed in further detail, stressors and events leading to presentation.    During current hospitalization direct care was provided by DAVID Pierce CNP.    At time of admission to inpatient unit 10N patient agreeable to continuing quetiapine and Depakote to target mood stabilization.  Depakote was titrated to therapeutic dose while monitoring VPA blood serum level as well as monitoring for side effects.  Patient tolerated this well.  Patient did report side effect of daytime fatigue with dose increases of Depakote however this resolved and at time of discharge patient denied any side effects of current medication regimen.  Valproic acid level at time of discharge was below therapeutic range at 36.7 (reference range  micrograms/mL).  Patient mood disorder symptoms were observed to be stable at time of discharge, and patient also reported his mood stabilized at current dose of Depakote.  Patient reports that in the past he has been psychiatrically stabilized on low doses of Depakote.  Patient also reports that that his preference is to follow-up with outpatient psychiatry provider for further management of psychiatric medications.     On day of discharge, provider met with patient in interview room of unit 10 N. Upon patient interview, patient reports their mood as, \" good.\"  Patient's affect appears full range.  Patient denies anxiety or depression symptoms.  Patient does report feeling excited for discharge later today.  Patient also reports some ongoing situational \"sadness " "and grief\" related to loss of communication with this child.  Patient denies any AH or VH. Patient denies any paranoid thoughts or delusions. Patient endorses sleeping well overnight. Patient reports appetite is stable and they are eating well and as well as drinking fluids adequately.  Denies any issues with bowel or bladder.  Patient vital signs reviewed and noted to be experiencing some ongoing hypertension which has been improving during hospitalization.  Patient was encouraged to establish care with a PCP and follow-up with hypertension, mildly elevated LFTs, and elevated triglycerides.  On 6/13/2024 patient also noted to have mildly elevated sodium level of 146 (reference range 135-145 mmol/L).  Provider consulted with internal medicine team and recommendations at time of discharge include for patient to drink fluids, follow-up with PCP within 7 days to obtain a repeat CMP, and recommend to go to ED if developed being muscle weakness, restlessness increased thirst, lethargy or confusion.  Patient verbalized understanding of this and agrees to follow-up outpatient. Patient denies any medical concerns today. Patient has no other questions or concerns. Today, plan will be to discharge to home with self-care.    Patient denies SI, SIB, SA, plan or intent. Denies HI. Patient is able to contract for safety. Patient does have notable risk factors for self-harm, including single status, previous suicide attempts, and report of recent substance use. However, risk is mitigated by commitment to family, ability to volunteer a safety plan, and history of seeking help when needed. Therefore, based on all available evidence including the factors cited above, patient does not appear to be at imminent risk for self-harm, does not meet criteria for a 72-hr hold, and therefore remains appropriate for ongoing outpatient level of care. Provider discussed with patient that if they develop thoughts of SI, SIB, SA, HI, plan or " intent, or any other medical emergency, to go to the nearest ED or call 911. Patient verbalizes understanding of this. Patient was also provided with crisis numbers and mental health resources in the community at time of discharge.      Labs completed during current hospitalization:     Latest Reference Range & Units 06/06/24 07:54 06/08/24 12:40 06/08/24 13:29 06/09/24 08:27 06/13/24 07:31   Sodium 135 - 145 mmol/L   140  146 (H)   Potassium 3.4 - 5.3 mmol/L   4.1  4.5   Chloride 98 - 107 mmol/L   101  107   Carbon Dioxide (CO2) 22 - 29 mmol/L   24  28   Urea Nitrogen 6.0 - 20.0 mg/dL   12.4  16.8   Creatinine 0.67 - 1.17 mg/dL   0.82  0.80   GFR Estimate >60 mL/min/1.73m2   >90  >90   Calcium 8.6 - 10.0 mg/dL   9.7  9.5   Anion Gap 7 - 15 mmol/L   15  11   Albumin 3.5 - 5.2 g/dL   4.8  4.3   Protein Total 6.4 - 8.3 g/dL   7.3  6.6   Alkaline Phosphatase 40 - 150 U/L   95  79   ALT 0 - 70 U/L   110 (H)  94 (H)   AST 0 - 45 U/L   48 (H)  33   Bilirubin Total <=1.2 mg/dL   0.2  0.2   Cholesterol <200 mg/dL    173    Creatinine Urine mg/dL 194       Folate 4.6 - 34.8 ng/mL   9.4     Glucose 70 - 99 mg/dL   87  97   HDL Cholesterol >=40 mg/dL    58    Hemoglobin A1C <5.7 %   5.5     LDL Cholesterol Calculated <=100 mg/dL    78    Non HDL Cholesterol <130 mg/dL    115    Triglycerides <150 mg/dL    187 (H)    TSH 0.30 - 4.20 uIU/mL   1.01     Vitamin B12 232 - 1,245 pg/mL   1,103     Vitamin D, Total (25-Hydroxy) 20 - 50 ng/mL   31     WBC 4.0 - 11.0 10e3/uL   12.5 (H)  7.3   Hemoglobin 13.3 - 17.7 g/dL   16.6  15.0   Hematocrit 40.0 - 53.0 %   49.3  45.8   Platelet Count 150 - 450 10e3/uL   349  297   RBC Count 4.40 - 5.90 10e6/uL   5.37  4.92   MCV 78 - 100 fL   92  93   MCH 26.5 - 33.0 pg   30.9  30.5   MCHC 31.5 - 36.5 g/dL   33.7  32.8   RDW 10.0 - 15.0 %   12.8  12.6   % Neutrophils %   63  46   % Lymphocytes %   25  39   % Monocytes %   7  10   % Eosinophils %   3  4   % Basophils %   1  1   Absolute Basophils  0.0 - 0.2 10e3/uL   0.1  0.1   Absolute Eosinophils 0.0 - 0.7 10e3/uL   0.3  0.3   Absolute Immature Granulocytes <=0.4 10e3/uL   0.1  0.0   Absolute Lymphocytes 0.8 - 5.3 10e3/uL   3.2  2.9   Absolute Monocytes 0.0 - 1.3 10e3/uL   0.8  0.7   % Immature Granulocytes %   1  0   Absolute Neutrophils 1.6 - 8.3 10e3/uL   8.0  3.4   Absolute NRBCs 10e3/uL   0.0  0.0   NRBCs per 100 WBC <1 /100   0  0   Amphetamine Qual Urine Screen Negative  Screen Negative       Fentanyl Qual Urine Screen Negative  Screen Negative       Cocaine Urine Screen Negative  Screen Negative       Benzodiazepine Urine Screen Negative  Screen Negative       Opiates Qualitative Urine Screen Negative  Screen Negative       PCP Urine Screen Negative  Screen Negative       Cannabinoids Qual Urine Screen Negative  Screen Positive !       Barbiturates Qual Urine Screen Negative  Screen Negative       THC Metabolite ng/mL 1,538       THC/Creatinine Ratio ng/mg Creat 793       EKG 12-LEAD, TRACING ONLY   Rpt (C)      Valproic acid ug/mL     36.7 (L)   (H): Data is abnormally high  !: Data is abnormal  (L): Data is abnormally low  (C): Corrected  Rpt: View report in Results Review for more information      Behavioral discharge plan and instructions were reviewed with patient and a copy was provided to them at time of discharge:    Behavioral Discharge Planning and Instructions     Summary: You were admitted to [facility] 10 on 6/6/2024 due to Suicidal Ideations. You were treated by DAVID Cartagena CNP and discharged on 06/13/2024 to Home.     Main Diagnosis:   Unspecified Bipolar and Related Disorder - r/o Substance-Induced Bipolar and Related Disorder vs Bipolar Disorder vs Personality Disorder      Health Care Follow-up:   Appointment Date/Time: Wednesday July 10th at 2pm (in person)   Psychiatrist/Medication Management: Staci Srinivasan at [facility]  Address: [address] [place] [address]   Phone Number:   [phone]     AND talk with them about getting a therapist at this location  For emergency access to psychiatry you can visit the [facility] [place] [facility]  Enter through the Emergency Department Door, continue past ED, Acute Psych Services is on the right  They can help if you are struggling to manage your mental health   They can assist with medication management in emergencies         Attend all scheduled appointments with your outpatient providers. Call at least 24 hours in advance if you need to reschedule an appointment to ensure continued access to your outpatient providers.      Major Treatments, Procedures and Findings:  You were provided with: a psychiatric assessment, assessed for medical stability, medication evaluation and/or management, group therapy, and milieu management     Symptoms to Report: Feeling more aggressive, increased confusion, losing more sleep, mood getting worse, or thoughts of suicide.     Early warning signs can include: Increased depression or anxiety sleep disturbances increased thoughts or behaviors of suicide or self-harm  increased unusual thinking, such as paranoia or hearing voices.     Safety and Wellness: Take all medicines as directed. Make no changes unless your doctor suggests them. Follow treatment recommendations. Refrain from alcohol and non-prescribed drugs. Ask your support system to help you reduce your access to items that could harm yourself or others. If there is a concern for safety, call 911.     Resources:   General Mental Health Resources:   National [place] on Mental Illness (LUKASZ) [place]: Connect for help, to navigate the mental health system, and for support and for resources. Call: 5-783-ITMR-Helps / 7-504-148-2132  Crisis Text Line: The 24/7 emergency service is available if you or someone you know is experiencing a psychiatric or mental health crisis. Text: \"[place]\" to 767696  [place] Warmline: Are you an adult needing support? Talk to a " "specialist who has firsthand experience living with a mental health condition. Call: 713.380.9139  Text: \"Support\" to 72387  [facility].org/support/[place]-warmline/  National Suicide Prevention Lifeline: The 24/7 lifeline provides support when in distress, has prevention and crisis resources for you or your loved ones, and resources for professionals. Call 9-792-320-UONP (0669)  Peer Support Connection Warmlines: Qykh-om-jecy telephone support that s safe and supportive. Open 5 p.m. to 9 a.m. Call or text: 1-422.300.1422   COVID Cares Stress Phone Support Service. Any [place] experiencing stress can call 956-QLIF7BJ (764-248-9799) for free telephone support from 9am to 9pm every day. The service is a collaboration with volunteers from the [place] Psychiatric Society, the [place] Psychological Association, the [facility] Psychologists, and [facility] [place]. The free service is also accessible at [employer] where searchers can also find psychiatric and mental health services availability and real-time Substance Use Disorder Treatment program openings.  Adult Rehabilitative Mental Health Services (ARMHS): https://[place].gov/dhs/partners-and-providers/policies-procedures/adult-mental-health/adult-rehabilitative-mental-health-services/[facility]-certified-providers/  Self- Management and Recovery Training., SMART-- Toll free: 781.360.4663  www.PLAYD8.[employer]  [facility] Crisis Response [phone]  [facility] Crisis Response - Adult [phone]  [facility] Rapid Response [phone]     Outpatient Psychiatry/Therapy Resources:   [facility] - (phone: 768.276.5839) https://www.VISUALPLANT/care/specialty/mental-behavioral-health/  https://www.VISUALPLANT/care/find/doctors/psychologists/  [facility] - (phone: 1-162-FAZPWZGK) https://www.[facility].org/treatments/Counseling-Adult  [place] Mental " "Health Clinics - (phone: 593.248.6672) https://[facility].iTwixie/  [facility] - (phone: 889.310.4438)  https://[facility] FoundationPrism Analytical Technologiesmn.com/  [facility] - (phone: 1-465.804.1284) https://www.Meludia.iTwixie/  [facility] - (phone: 955.701.4286) https://www.[facility].iTwixie/  [facility] - (phone: 205.960.7766) https://eOriginal.iTwixie/  [facility] - (phone: 920.741.7496) https://walkin.org/     General Medication Instructions:   See your medication sheet(s) for instructions.   Take all medicines as directed.  Make no changes unless your doctor suggests them.   Go to all your doctor visits.  Be sure to have all your required lab tests. This way, your medicines can be refilled on time.  Do not use any drugs not prescribed by your doctor.  Avoid alcohol.     Advance Directives:   Scanned document on file with [facility]? No scanned doc  Is document scanned? No. Copy Requested.  Honoring Choices Your Rights Handout: Informed and given  Was more information offered? Pt declined     The Treatment team has appreciated the opportunity to work with you. If you have any questions or concerns about your recent admission, you can contact the unit which can receive your call 24 hours a day, 7 days a week. They will be able to get in touch with a Provider if needed. The unit number is [phone].         MENTAL STATUS EXAM   Vitals: BP (!) 142/99 (BP Location: Left arm, Patient Position: Sitting, Cuff Size: Adult Regular)   Pulse 62   Temp 97.7  F (36.5  C) (Oral)   Resp 16   Wt 103.5 kg (228 lb 1.6 oz)   SpO2 100%   BMI 33.68 kg/m      Mental Status:     Appearance:  Casually groomed  Mood: Reports, \"pretty good.\"    Affect: full range was congruent to speech  Suicidal Ideation: PRESENT / ABSENT: absent   Homicidal Ideation: PRESENT / ABSENT: absent   Thought process: Logical and goal oriented, slightly circumstantial  Thought content: denies suicidal and " violent ideation.   Fund of Knowledge: Average  Attention/Concentration: Fair  Language ability:  Intact  Memory: Appears intact, not formally assessed  Insight: Fair  Judgement: Fair  Orientation: Yes, x4  Psychomotor Behavior: normal or unremarkable    Muscle Strength and Tone: MuscleStrength: Normal  Gait and Station: Normal         DISCHARGE MEDICATIONS   Discharge Medication Options:   Current Discharge Medication List        START taking these medications    Details   divalproex sodium extended-release (DEPAKOTE ER) 250 MG 24 hr tablet Take 3 tablets (750 mg) by mouth at bedtime  Qty: 90 tablet, Refills: 0    Associated Diagnoses: Major depressive disorder, recurrent severe without psychotic features (H)      QUEtiapine (SEROQUEL XR) 50 MG TB24 24 hr tablet Take 2 tablets (100 mg) by mouth at bedtime  Qty: 60 tablet, Refills: 0    Associated Diagnoses: Major depressive disorder, recurrent severe without psychotic features (H)      QUEtiapine (SEROQUEL) 25 MG tablet Take 1 tablet (25 mg) by mouth 2 times daily as needed (agitition)  Qty: 60 tablet, Refills: 0    Associated Diagnoses: Major depressive disorder, recurrent severe without psychotic features (H)      salicylic acid (COMPOUND W MAX STRENGTH) 17 % external gel Apply topically daily  Qty: 1.25 g, Refills: 0    Associated Diagnoses: Major depressive disorder, recurrent severe without psychotic features (H)           STOP taking these medications       sucralfate (CARAFATE) 1 GM/10ML suspension Comments:   Reason for Stopping:               Medication adherence issues: MS Med Adherence Y/N: No  Medication side effects: MEDICATION SIDE EFFECTS: no side effects reported         DISCHARGE PLAN   1.  Education given regarding diagnostic and treatment options with risks, benefits and alternatives with adequate verbalization of understanding.  2.  Discharge to home with self-care.  Upon detailed review of risk factors, patient amenable for release.   3.   Continue aforementioned medications and associated medication changes with follow-up by outpatient mental health provider.  4.  Crisis management planning in place.    5.  Continue efforts for sobriety.  6. Attend follow up appointments:     Health Care Follow-up:   Appointment Date/Time: Wednesday July 10th at 2pm (in person)   Psychiatrist/Medication Management: Staci Srinivasan at [facility]  Address: [address] [place] [address], [place] [address]   Phone Number:  [phone]     AND talk with them about getting a therapist at this location  For emergency access to psychiatry you can visit the Acute Psych Services at [facility]  Enter through the Emergency Department Door, continue past ED, Acute Psych Services is on the right  They can help if you are struggling to manage your mental health   They can assist with medication management in emergencies    .      Nursing and  to review further discharge recommendations.     TOTAL TIME:  Greater than 30 minutes for discharge planning.    This note was created with help of Dragon dictation system. Grammatical / typing errors are not intentional.    DAVID Pierce CNP